# Patient Record
Sex: MALE | Race: WHITE | NOT HISPANIC OR LATINO | Employment: FULL TIME | URBAN - METROPOLITAN AREA
[De-identification: names, ages, dates, MRNs, and addresses within clinical notes are randomized per-mention and may not be internally consistent; named-entity substitution may affect disease eponyms.]

---

## 2017-01-25 ENCOUNTER — GENERIC CONVERSION - ENCOUNTER (OUTPATIENT)
Dept: OTHER | Facility: OTHER | Age: 57
End: 2017-01-25

## 2017-01-25 ENCOUNTER — LAB CONVERSION - ENCOUNTER (OUTPATIENT)
Dept: OTHER | Facility: OTHER | Age: 57
End: 2017-01-25

## 2017-01-25 LAB — TSH SERPL DL<=0.05 MIU/L-ACNC: 6.23 MIU/L (ref 0.4–4.5)

## 2017-02-06 ENCOUNTER — ALLSCRIPTS OFFICE VISIT (OUTPATIENT)
Dept: OTHER | Facility: OTHER | Age: 57
End: 2017-02-06

## 2017-06-29 ENCOUNTER — LAB CONVERSION - ENCOUNTER (OUTPATIENT)
Dept: OTHER | Facility: OTHER | Age: 57
End: 2017-06-29

## 2017-06-29 ENCOUNTER — GENERIC CONVERSION - ENCOUNTER (OUTPATIENT)
Dept: OTHER | Facility: OTHER | Age: 57
End: 2017-06-29

## 2017-06-29 LAB
A/G RATIO (HISTORICAL): 1.6 (CALC) (ref 1–2.5)
ALBUMIN SERPL BCP-MCNC: 4.4 G/DL (ref 3.6–5.1)
ALP SERPL-CCNC: 45 U/L (ref 40–115)
ALT SERPL W P-5'-P-CCNC: 19 U/L (ref 9–46)
AST SERPL W P-5'-P-CCNC: 17 U/L (ref 10–35)
BASOPHILS # BLD AUTO: 0.7 %
BASOPHILS # BLD AUTO: 39 CELLS/UL (ref 0–200)
BILIRUB SERPL-MCNC: 1.3 MG/DL (ref 0.2–1.2)
BUN SERPL-MCNC: 10 MG/DL (ref 7–25)
BUN/CREA RATIO (HISTORICAL): ABNORMAL (CALC) (ref 6–22)
CALCIUM SERPL-MCNC: 9.3 MG/DL (ref 8.6–10.3)
CHLORIDE SERPL-SCNC: 105 MMOL/L (ref 98–110)
CHOLEST SERPL-MCNC: 222 MG/DL (ref 125–200)
CHOLEST/HDLC SERPL: 4.4 (CALC)
CO2 SERPL-SCNC: 29 MMOL/L (ref 20–31)
CREAT SERPL-MCNC: 0.92 MG/DL (ref 0.7–1.33)
DEPRECATED RDW RBC AUTO: 14.5 % (ref 11–15)
EGFR AFRICAN AMERICAN (HISTORICAL): 105 ML/MIN/1.73M2
EGFR-AMERICAN CALC (HISTORICAL): 91 ML/MIN/1.73M2
EOSINOPHIL # BLD AUTO: 386 CELLS/UL (ref 15–500)
EOSINOPHIL # BLD AUTO: 6.9 %
GAMMA GLOBULIN (HISTORICAL): 2.7 G/DL (CALC) (ref 1.9–3.7)
GLUCOSE (HISTORICAL): 92 MG/DL (ref 65–99)
HCT VFR BLD AUTO: 46.9 % (ref 38.5–50)
HDLC SERPL-MCNC: 50 MG/DL
HGB BLD-MCNC: 15.6 G/DL (ref 13.2–17.1)
LDL CHOLESTEROL (HISTORICAL): 135 MG/DL (CALC)
LYMPHOCYTES # BLD AUTO: 1775 CELLS/UL (ref 850–3900)
LYMPHOCYTES # BLD AUTO: 31.7 %
MCH RBC QN AUTO: 30.4 PG (ref 27–33)
MCHC RBC AUTO-ENTMCNC: 33.2 G/DL (ref 32–36)
MCV RBC AUTO: 91.4 FL (ref 80–100)
MONOCYTES # BLD AUTO: 521 CELLS/UL (ref 200–950)
MONOCYTES (HISTORICAL): 9.3 %
NEUTROPHILS # BLD AUTO: 2878 CELLS/UL (ref 1500–7800)
NEUTROPHILS # BLD AUTO: 51.4 %
NON-HDL-CHOL (CHOL-HDL) (HISTORICAL): 172 MG/DL (CALC)
PLATELET # BLD AUTO: 227 THOUSAND/UL (ref 140–400)
PMV BLD AUTO: 8.2 FL (ref 7.5–12.5)
POTASSIUM SERPL-SCNC: 4.1 MMOL/L (ref 3.5–5.3)
RBC # BLD AUTO: 5.13 MILLION/UL (ref 4.2–5.8)
SODIUM SERPL-SCNC: 140 MMOL/L (ref 135–146)
T4 FREE SERPL-MCNC: 1.5 NG/DL (ref 0.8–1.8)
TOTAL PROTEIN (HISTORICAL): 7.1 G/DL (ref 6.1–8.1)
TRIGL SERPL-MCNC: 185 MG/DL
TSH SERPL DL<=0.05 MIU/L-ACNC: 7.57 MIU/L (ref 0.4–4.5)
WBC # BLD AUTO: 5.6 THOUSAND/UL (ref 3.8–10.8)

## 2017-07-03 DIAGNOSIS — E78.5 HYPERLIPIDEMIA: ICD-10-CM

## 2017-07-03 DIAGNOSIS — E03.9 HYPOTHYROIDISM: ICD-10-CM

## 2017-07-03 DIAGNOSIS — Z12.5 ENCOUNTER FOR SCREENING FOR MALIGNANT NEOPLASM OF PROSTATE: ICD-10-CM

## 2017-07-03 DIAGNOSIS — Z00.00 ENCOUNTER FOR GENERAL ADULT MEDICAL EXAMINATION WITHOUT ABNORMAL FINDINGS: ICD-10-CM

## 2017-07-10 ENCOUNTER — ALLSCRIPTS OFFICE VISIT (OUTPATIENT)
Dept: OTHER | Facility: OTHER | Age: 57
End: 2017-07-10

## 2017-07-25 ENCOUNTER — GENERIC CONVERSION - ENCOUNTER (OUTPATIENT)
Dept: OTHER | Facility: OTHER | Age: 57
End: 2017-07-25

## 2017-07-25 ENCOUNTER — LAB CONVERSION - ENCOUNTER (OUTPATIENT)
Dept: OTHER | Facility: OTHER | Age: 57
End: 2017-07-25

## 2017-07-25 LAB
T4 FREE SERPL-MCNC: 1.3 NG/DL (ref 0.8–1.8)
TSH SERPL DL<=0.05 MIU/L-ACNC: 6.2 MIU/L (ref 0.4–4.5)

## 2017-08-08 DIAGNOSIS — E03.9 HYPOTHYROIDISM: ICD-10-CM

## 2017-08-12 ENCOUNTER — LAB CONVERSION - ENCOUNTER (OUTPATIENT)
Dept: OTHER | Facility: OTHER | Age: 57
End: 2017-08-12

## 2017-08-12 LAB
T4 FREE SERPL-MCNC: 1.4 NG/DL (ref 0.8–1.8)
TSH SERPL DL<=0.05 MIU/L-ACNC: 4.26 MIU/L (ref 0.4–4.5)

## 2018-01-09 NOTE — RESULT NOTES
Message    Colon polyps removed came back as benign hyperplastic  Next colonoscopy in 5 years because of the patient's family history         LM w/ whomever answered the phone to have pt call the office/reminder set  thanks CF1      Patient aware of results  lc2  1       1 Amended By: Woody Posada; May 09 2016 11:02 AM EST   2 Amended By: Charleen Zayas; May 11 2016 3:35 PM EST    Verified Results  COLONOSCOPY [id number] 05:43PM Delphizuly Drown     Test Name Result Flag Reference   Colonoscopy 5/2/2016       (1) TISSUE EXAM [id number] 12:22PM Delphia Drown     Test Name Result Flag Reference   LAB AP CASE REPORT (Report)     Surgical Pathology Report             Case: [id number]                   Authorizing Provider: Anthony Linares MD     Collected:      05/02/2016 1222        Ordering Location:   [facility]    Received:      05/02/2016 [facility]                               Pathologist:      Travis [place] DO                               Specimens:  A) - Polyp, Colorectal, Bx descending polyp                              B) - Polyp, Colorectal, bx sigmoid polyp   LAB AP FINAL DIAGNOSIS      A  Colon, descending, polyp, biopsy:  - Hyperplastic polyp  B  Colon, sigmoid, polyp, biopsy:  - Hyperplastic polyps  Interpretation performed at [facility] [address]   [address] [facility]   LAB AP SURGICAL ADDITIONAL INFORMATION (Report)     These tests were developed and their performance characteristics   determined by [facility] or [employer]  They may not be cleared or approved by the [place]  Food and   Drug Administration  The FDA has determined that such clearance or   approval is not necessary  These tests are used for clinical purposes  They should not be regarded as investigational or for research   This   laboratory has been approved by CLIA 88, designated as a high-complexity   laboratory and is qualified to perform these tests  LAB AP GROSS DESCRIPTION (Report)     A  The specimen is received in formalin, labeled with patient name and   hospital number, and is designated biopsy descending polyp  The specimen   consists of a single white tan polypoid tissue fragment measuring 0 5   centimeters in greatest dimension  The specimen is entirely submitted in   one cassette  B  The specimen is received in formalin, labeled with patient name and   hospital number, and is designated biopsy sigmoid polyp  The specimen   consists of 2 tan-pink polypoid tissue fragments measuring 0 4 and 0 6   centimeters in greatest dimension  The specimen is entirely submitted in   one cassette  Note: The estimated total formalin fixation time based upon information   provided by the submitting clinician and the standard processing schedule   is 16 25 hours         Plan  Screening for colon cancer    · COLONOSCOPY ; every 5 years;  Last 92SIW0293; Next 12HMU5994; Status:Active

## 2018-01-10 DIAGNOSIS — E03.9 HYPOTHYROIDISM: ICD-10-CM

## 2018-01-10 DIAGNOSIS — E78.5 HYPERLIPIDEMIA: ICD-10-CM

## 2018-01-10 NOTE — PROGRESS NOTES
Assessment    1  Hypothyroidism (244 9) (E03 9)   2  Hyperlipidemia (272 4) (E78 5)   3  Well adult on routine health check (V70 0) (Z00 00)   4  Varicose veins of both lower extremities (454 9) (I83 93)    Plan  Hyperlipidemia, Hypothyroidism    · (1) COMPREHENSIVE METABOLIC PANEL; Status:Active; Requested RRI:82SLR9629;    · (1) LIPID PANEL, FASTING; Status:Active; Requested YKT:[id number];    · (1) T4, FREE; Status:Active; Requested AYB:26GKI4920;    · (1) TSH; Status:Active; Requested UQZ:77FOQ5057;   Hypothyroidism    · Levothyroxine Sodium 75 MCG Oral Tablet; take 1 tablet every day   · (1) T4, FREE; Status:Active; Requested for:85Pnd5064;    · (1) TSH; Status:Active; Requested for:65Olf7923;     Discussion/Summary  Impression: health maintenance visit, healthy adult male  Currently, he eats a healthy diet and has an adequate exercise regimen  Prostate cancer screening: the risks and benefits of prostate cancer screening were discussed and PSA was ordered  Testicular cancer screening: the risks and benefits of testicular cancer screening were discussed and testicular cancer screening is current  Colorectal cancer screening: the risks and benefits of colorectal cancer screening were discussed, the next colonoscopy is due Now and colorectal cancer screening is managed by Dr Liana Lawrence  Advice and education were given regarding aerobic exercise, weight bearing exercise, weight loss and cardiovascular risk reduction      - Generally healthy 49-year-old male  Current with screening colonoscopy and PSA  - Repeat TSH was elevated at 7 57 with a free T4 of 1 5  Patient is presently on levothyroxine 50 Âµg once daily  Will increase dose of levothyroxine to 75 Âµg once daily  Titration every 2 weeks until therapeutic  Adjustments will be made via telephone  - Patient declines referral to vascular surgery at this time for his varicose veins  He will consider this at a later time to discuss options for treatment   In the meantime he can continue wearing compression socks especially while he is on his feet  - Repeat colonoscopy in 2021  - Follow-up in 6 months with repeat thyroid function studies and cholesterol  Hopefully once his hypothyroidism is controlled then his cholesterol should improve  Chief Complaint  Preventative visit  History of Present Illness  , Adult Male: The patient is being seen for a health maintenance evaluation  The last health maintenance visit was 1 year(s) ago  General Health: The patient's health since the last visit is described as good  He has regular dental visits  He denies vision problems  He denies hearing loss  Immunizations status: not up to date  Lifestyle:  He consumes a diverse and healthy diet  He does not have any weight concerns  He exercises regularly  He does not use tobacco  He denies alcohol use  Screening: cancer screening reviewed and current  metabolic screening reviewed and current  risk screening reviewed and current  HPI: 52-year-old male presents for annual physical examination  No particular complaints or concerns  Patient has always been in good health  Patient did have a colonoscopy in May of 2016 which showed a small sigmoid diverticula and diminutive colon polyp which was a small tubular adenoma  He is due for repeat colonoscopy in 2021  Patient did have PSA screening done through his employer for biometrics which was reportedly normal  Hypothyroidism is not ideally controlled  TSH of 7 57 despite being on levothyroxine 50 Âµg daily  Cholesterol remains mildly elevated with total cholesterol of 220, HDL 50,   Patient does have varicose veins of bilateral lower extremities  He does wear compression socks when he is working  They are never painful  Review of Systems    Constitutional: No fever or chills, feels well, no tiredness, no recent weight gain or weight loss     Eyes: No complaints of eye pain, no red eyes, no discharge from eyes, no itchy eyes  ENT: no complaints of earache, no hearing loss, no nosebleeds, no nasal discharge, no sore throat, no hoarseness  Cardiovascular: No complaints of slow heart rate, no fast heart rate, no chest pain, no palpitations, no leg claudication, no lower extremity  Respiratory: No complaints of shortness of breath, no wheezing, no cough, no SOB on exertion, no orthopnea or PND  Gastrointestinal: No complaints of abdominal pain, no constipation, no nausea or vomiting, no diarrhea or bloody stools  Genitourinary: No complaints of dysuria, no incontinence, no hesitancy, no nocturia, no genital lesion, no testicular pain  Musculoskeletal: No complaints of arthralgia, no myalgias, no joint swelling or stiffness, no limb pain or swelling  Integumentary: No complaints of skin rash or skin lesions, no itching, no skin wound, no dry skin  Neurological: No compliants of headache, no confusion, no convulsions, no numbness or tingling, no dizziness or fainting, no limb weakness, no difficulty walking  Psychiatric: Is not suicidal, no sleep disturbances, no anxiety or depression, no change in personality, no emotional problems  Endocrine: No complaints of proptosis, no hot flashes, no muscle weakness, no erectile dysfunction, no deepening of the voice, no feelings of weakness  Hematologic/Lymphatic: No complaints of swollen glands, no swollen glands in the neck, does not bleed easily, no easy bruising  Active Problems    1  Colon polyps (211 3) (K63 5)   2  Family history of colon cancer (V16 0) (Z80 0)   3  Hyperlipidemia (272 4) (E78 5)   4  Hypothyroidism (244 9) (E03 9)   5  Screening for colon cancer (V76 51) (Z12 11)   6  Special screening examination for neoplasm of prostate (V76 44) (Z12 5)   7   Well adult on routine health check (V70 0) (Z00 00)    Past Medical History    · History of Cellulitis of ankle (682 6) (L03 119)   · History of nicotine dependence (V15 82) (G22 574)    Surgical History    · History of Reported Prior Surgical / Procedural History    Family History  Mother    · Denied: Family history of Crohn's disease without complication, unspecified  gastrointestinal tract location   · Family history of hypertension (V17 49) (Z82 49)   · Denied: Family history of liver disease  Father    · Family history of Colon cancer, ascending   · Denied: Family history of Crohn's disease without complication, unspecified  gastrointestinal tract location   · Denied: Family history of liver disease   · Family history of myocardial infarction (V17 3) (Z82 49)    Social History    · Caffeine use (V49 89) (F15 90)   · Daily caffeine consumption, 2-3 servings a day   · Former smoker (V15 82) (Z87 891)   · Four children   ·    · Occupation   · Social alcohol use (Z78 9)    Current Meds   1  Levothyroxine Sodium 50 MCG Oral Tablet; take 1 tablet by mouth every morning; Therapy: 71LYD0230 to (Evaluate:95Cvm8349)  Requested for: 56VJP3874; Last   Rx:24Wit0047 Ordered    Allergies    1  No Known Drug Allergies    Vitals   Recorded: 18XDH3156 07:57AM   Heart Rate 65   Respiration 16   Systolic 497   Diastolic 68   Height 6 ft 1 in   Weight 200 lb    BMI Calculated 26 39   BSA Calculated 2 15   O2 Saturation 98     Physical Exam    Constitutional   General appearance: No acute distress, well appearing and well nourished  Eyes   Conjunctiva and lids: No erythema, swelling or discharge  Pupils and irises: Equal, round, reactive to light  Ophthalmoscopic examination: Normal fundi and optic discs  Ears, Nose, Mouth, and Throat   External inspection of ears and nose: Normal     Otoscopic examination: Tympanic membranes translucent with normal light reflex  Canals patent without erythema  Hearing: Normal     Nasal mucosa, septum, and turbinates: Normal without edema or erythema  Lips, teeth, and gums: Normal, good dentition  Oropharynx: Normal with no erythema, edema, exudate or lesions  Neck   Neck: Supple, symmetric, trachea midline, no masses  Thyroid: Normal, no thyromegaly  Pulmonary   Respiratory effort: No increased work of breathing or signs of respiratory distress  Percussion of chest: Normal     Palpation of chest: Normal     Auscultation of lungs: Clear to auscultation  Cardiovascular   Palpation of heart: Normal PMI, no thrills  Auscultation of heart: Normal rate and rhythm, normal S1 and S2, no murmurs  Carotid pulses: 2+ bilaterally  Abdominal aorta: Normal     Femoral pulses: 2+ bilaterally  Pedal pulses: 2+ bilaterally  Examination of extremities for edema and/or varicosities: Normal     Chest   Breasts: Normal, no dimpling or skin changes appreciated  Palpation of breasts and axillae: Normal, no masses palpated  Chest: Normal     Abdomen   Abdomen: Non-tender, no masses  Liver and spleen: No hepatomegaly or splenomegaly  Examination for hernias: No hernias appreciated  Anus, perineum, and rectum: Normal sphincter tone, no masses, no prolapse  Lymphatic   Palpation of lymph nodes in neck: No lymphadenopathy  Palpation of lymph nodes in axillae: No lymphadenopathy  Palpation of lymph nodes in groin: No lymphadenopathy  Palpation of lymph nodes in other areas: No lymphadenopathy  Musculoskeletal   Gait and station: Normal     Inspection/palpation of digits and nails: Normal without clubbing or cyanosis  Inspection/palpation of joints, bones, and muscles: Normal     Range of motion: Normal     Stability: Normal     Muscle strength/tone: Normal     Skin   Skin and subcutaneous tissue: Normal without rashes or lesions  Palpation of skin and subcutaneous tissue: Normal turgor  Neurologic   Cranial nerves: Cranial nerves 2-12 intact  Reflexes: 2+ and symmetric  Sensation: No sensory loss      Psychiatric   Judgment and insight: Normal     Orientation to person, place and time: Normal     Recent and remote memory: Intact  Mood and affect: Normal        Results/Data  PHQ-2 Adult Depression Screening 81QOF3386 08:00AM User, Ahs     Test Name Result Flag Reference   PHQ-2 Adult Depression Score 0     Over the last two weeks, how often have you been bothered by any of the following problems? Little interest or pleasure in doing things: Not at all - 0  Feeling down, depressed, or hopeless: Not at all - 0   PHQ-2 Adult Depression Screening Negative         Procedure    Procedure:   Results: 20/20 in the right eye with corrective device, 20/20 in the left eye with corrective device WENT TO [address] [place] [address]  Screening for colon cancer   COLONOSCOPY; every 5 years; Last 99MXD0361; Next Due: 10SVQ6102;  Active    Signatures   Electronically signed by : Uyen Odell DO; Jul 10 2017  8:27AM EST                       (Author)

## 2018-01-11 ENCOUNTER — LAB CONVERSION - ENCOUNTER (OUTPATIENT)
Dept: OTHER | Facility: OTHER | Age: 58
End: 2018-01-11

## 2018-01-11 ENCOUNTER — GENERIC CONVERSION - ENCOUNTER (OUTPATIENT)
Dept: OTHER | Facility: OTHER | Age: 58
End: 2018-01-11

## 2018-01-11 LAB
A/G RATIO (HISTORICAL): 1.5 (CALC) (ref 1–2.5)
ALBUMIN SERPL BCP-MCNC: 4.3 G/DL (ref 3.6–5.1)
ALP SERPL-CCNC: 51 U/L (ref 40–115)
ALT SERPL W P-5'-P-CCNC: 24 U/L (ref 9–46)
AST SERPL W P-5'-P-CCNC: 18 U/L (ref 10–35)
BILIRUB SERPL-MCNC: 0.8 MG/DL (ref 0.2–1.2)
BUN SERPL-MCNC: 13 MG/DL (ref 7–25)
BUN/CREA RATIO (HISTORICAL): NORMAL (CALC) (ref 6–22)
CALCIUM SERPL-MCNC: 9.5 MG/DL (ref 8.6–10.3)
CHLORIDE SERPL-SCNC: 105 MMOL/L (ref 98–110)
CHOLEST SERPL-MCNC: 220 MG/DL
CHOLEST/HDLC SERPL: 4.9 (CALC)
CO2 SERPL-SCNC: 29 MMOL/L (ref 20–31)
CREAT SERPL-MCNC: 0.98 MG/DL (ref 0.7–1.33)
EGFR AFRICAN AMERICAN (HISTORICAL): 97 ML/MIN/1.73M2
EGFR-AMERICAN CALC (HISTORICAL): 84 ML/MIN/1.73M2
GAMMA GLOBULIN (HISTORICAL): 2.8 G/DL (CALC) (ref 1.9–3.7)
GLUCOSE (HISTORICAL): 95 MG/DL (ref 65–99)
HDLC SERPL-MCNC: 45 MG/DL
LDL CHOLESTEROL (HISTORICAL): 143 MG/DL (CALC)
NON-HDL-CHOL (CHOL-HDL) (HISTORICAL): 175 MG/DL (CALC)
POTASSIUM SERPL-SCNC: 4.3 MMOL/L (ref 3.5–5.3)
SODIUM SERPL-SCNC: 139 MMOL/L (ref 135–146)
T4 FREE SERPL-MCNC: 1.2 NG/DL (ref 0.8–1.8)
TOTAL PROTEIN (HISTORICAL): 7.1 G/DL (ref 6.1–8.1)
TRIGL SERPL-MCNC: 188 MG/DL
TSH SERPL DL<=0.05 MIU/L-ACNC: 4.98 MIU/L (ref 0.4–4.5)

## 2018-01-11 NOTE — RESULT NOTES
Discussion/Summary   Cholesterol slightly worse, TSH slightly more elevated  Need increased dose of levothyroxine  Verified Results  (1) CBC/PLT/DIFF 22XUS9115 07:26AM [facility]     Test Name Result Flag Reference   WHITE BLOOD CELL COUNT 5 6 Thousand/uL  3 8-10 8   RED BLOOD CELL COUNT 5 13 Million/uL  4 20-5 80   HEMOGLOBIN 15 6 g/dL  13 2-17 1   HEMATOCRIT 46 9 %  38 5-50 0   MCV 91 4 fL  80 0-100 0   MCH 30 4 pg  27 0-33 0   MCHC 33 2 g/dL  32 0-36 0   RDW 14 5 %  11 0-15 0   PLATELET COUNT 028 Thousand/uL  140-400   ABSOLUTE NEUTROPHILS 2878 cells/uL  5790-0044   ABSOLUTE LYMPHOCYTES 1775 cells/uL  850-3900   ABSOLUTE MONOCYTES 521 cells/uL  200-950   ABSOLUTE EOSINOPHILS 386 cells/uL     ABSOLUTE BASOPHILS 39 cells/uL  0-200   NEUTROPHILS 51 4 %     LYMPHOCYTES 31 7 %     MONOCYTES 9 3 %     EOSINOPHILS 6 9 %     BASOPHILS 0 7 %     MPV 8 2 fL  7 5-12 5     (1) COMPREHENSIVE METABOLIC PANEL 92OMR4280 76:68YJ [facility]     Test Name Result Flag Reference   GLUCOSE 92 mg/dL  65-99   Fasting reference interval   UREA NITROGEN (BUN) 10 mg/dL  7-25   CREATININE 0 92 mg/dL  0 70-1 33   For patients >52years of age, the reference limit  for Creatinine is approximately 13% higher for people  identified as -American  eGFR NON-AFR   AMERICAN 91 mL/min/1 73m2  > OR = 60   eGFR AFRICAN AMERICAN 105 mL/min/1 73m2  > OR = 60   BUN/CREATININE RATIO   6-05   NOT APPLICABLE (calc)   SODIUM 140 mmol/L  135-146   POTASSIUM 4 1 mmol/L  3 5-5 3   CHLORIDE 105 mmol/L     CARBON DIOXIDE 29 mmol/L  20-31   CALCIUM 9 3 mg/dL  8 6-10 3   PROTEIN, TOTAL 7 1 g/dL  6 1-8 1   ALBUMIN 4 4 g/dL  3 6-5 1   GLOBULIN 2 7 g/dL (calc)  1 9-3 7   ALBUMIN/GLOBULIN RATIO 1 6 (calc)  1 0-2 5   BILIRUBIN, TOTAL 1 3 mg/dL H 0 2-1 2   ALKALINE PHOSPHATASE 45 U/L     AST 17 U/L  10-35   ALT 19 U/L  9-46     (1) T4, FREE 28Jun2017 07:26AM Bibiana Constable     Test Name Result Flag Reference   T4, FREE 1 5 ng/dL 0 8-1 8     (1) LIPID PANEL, FASTING 28Jun2017 07:26AM Evaline Martinez     Test Name Result Flag Reference   CHOLESTEROL, TOTAL 222 mg/dL H 125-200   HDL CHOLESTEROL 50 mg/dL  > OR = 40   TRIGLICERIDES 104 mg/dL H <150   LDL-CHOLESTEROL 135 mg/dL (calc) H <130   Desirable range <100 mg/dL for patients with CHD or  diabetes and <70 mg/dL for diabetic patients with  known heart disease  CHOL/HDLC RATIO 4 4 (calc)  < OR = 5 0   NON HDL CHOLESTEROL 172 mg/dL (calc) H    Target for non-HDL cholesterol is 30 mg/dL higher than   LDL cholesterol target       (Q) TSH, 3RD GENERATION [id number] 07:26AM Evaline Martinez   REPORT COMMENT:  FASTING:YES     Test Name Result Flag Reference   TSH 7 57 mIU/L H 0 40-4 50

## 2018-01-11 NOTE — PROGRESS NOTES
Assessment    1  Encounter for preventive health examination (V70 0) (Z00 00)   2  Special screening examination for neoplasm of prostate (V76 44) (Z12 5)   3  Hypothyroidism (244 9) (E03 9)    Plan  Health Maintenance, Hypothyroidism, Special screening examination for neoplasm of  prostate    · (1) CBC/PLT/DIFF; Status:Active; Requested for:20Stl5439;    · (1) TSH WITH FT4 REFLEX; Status:Active; Requested for:61Agf6130; Health Maintenance, Special screening examination for neoplasm of prostate    · (1) COMPREHENSIVE METABOLIC PANEL; Status:Active; Requested for:60Okn0513;    · (1) LIPID PANEL, FASTING; Status:Active; Requested for:12Yyh4408;    · (1) PSA (SCREEN) (Dx V76 44 Screen for Prostate Cancer); Status:Active; Requested  for:89Wmn8555;     Discussion/Summary  Impression: health maintenance visit, healthy adult male  Currently, he eats a healthy diet and has an adequate exercise regimen  Prostate cancer screening: the risks and benefits of prostate cancer screening were discussed and PSA was ordered  Testicular cancer screening: the risks and benefits of testicular cancer screening were discussed and testicular cancer screening is current  Colorectal cancer screening: the risks and benefits of colorectal cancer screening were discussed, the next colonoscopy is due Now and colorectal cancer screening is managed by Dr Consuelo Olivia  - Generally healthy 61-year-old male  Order provided for screening labs  - Patient did have abnormal thyroid function studies done in 2009  He is concerned that his insurance may not cover TSH  He did not have repeat TSH done with last set of labs  He will contact his insurance company to see that they will cover lab testing  - Repeat colonoscopy in 2021  -We'll mail the results to the patient barring any significant abnormalities  Followup in one year for repeat annual physical examination  - Offered tetanus booster which the patient declines at this time   He is uncertain when his last tetanus booster was  Chief Complaint  Preventative visit  History of Present Illness  HM, Adult Male: The patient is being seen for a health maintenance evaluation  The last health maintenance visit was 1 year(s) ago  General Health: The patient's health since the last visit is described as good  He has regular dental visits  He denies vision problems  He denies hearing loss  Immunizations status: not up to date  Lifestyle:  He consumes a diverse and healthy diet  He does not have any weight concerns  He exercises regularly  He does not use tobacco  He denies alcohol use  Screening: cancer screening reviewed and current  metabolic screening reviewed and current  risk screening reviewed and current  HPI: 67-year-old male presents for annual physical examination  No particular complaints or concerns  Patient has always been in good health  Patient did have a colonoscopy in May of this year which showed a small sigmoid diverticula and diminutive colon polyp which was a small tubular adenoma  He is due for repeat colonoscopy in 2021  Review of Systems    Constitutional: No fever or chills, feels well, no tiredness, no recent weight gain or weight loss  Eyes: No complaints of eye pain, no red eyes, no discharge from eyes, no itchy eyes  ENT: no complaints of earache, no hearing loss, no nosebleeds, no nasal discharge, no sore throat, no hoarseness  Cardiovascular: No complaints of slow heart rate, no fast heart rate, no chest pain, no palpitations, no leg claudication, no lower extremity  Respiratory: No complaints of shortness of breath, no wheezing, no cough, no SOB on exertion, no orthopnea or PND  Gastrointestinal: No complaints of abdominal pain, no constipation, no nausea or vomiting, no diarrhea or bloody stools  Genitourinary: No complaints of dysuria, no incontinence, no hesitancy, no nocturia, no genital lesion, no testicular pain     Musculoskeletal: No complaints of arthralgia, no myalgias, no joint swelling or stiffness, no limb pain or swelling  Integumentary: No complaints of skin rash or skin lesions, no itching, no skin wound, no dry skin  Neurological: No compliants of headache, no confusion, no convulsions, no numbness or tingling, no dizziness or fainting, no limb weakness, no difficulty walking  Psychiatric: Is not suicidal, no sleep disturbances, no anxiety or depression, no change in personality, no emotional problems  Endocrine: No complaints of proptosis, no hot flashes, no muscle weakness, no erectile dysfunction, no deepening of the voice, no feelings of weakness  Hematologic/Lymphatic: No complaints of swollen glands, no swollen glands in the neck, does not bleed easily, no easy bruising  Active Problems    1  Family history of colon cancer (V16 0) (Z80 0)   2  Hypothyroidism (244 9) (E03 9)   3  Screening for colon cancer (V76 51) (Z12 11)   4  Special screening examination for neoplasm of prostate (V76 44) (Z12 5)   5   Well adult on routine health check (V70 0) (Z00 00)    Past Medical History    · History of Cellulitis of ankle (682 6) (L03 119)   · History of nicotine dependence (V15 82) (S35 078)    Surgical History    · History of Reported Prior Surgical / Procedural History    Family History  Mother    · Denied: Family history of Crohn's disease without complication, unspecified  gastrointestinal tract location   · Family history of hypertension (V17 49) (Z82 49)   · Denied: Family history of liver disease  Father    · Family history of Colon cancer, ascending   · Denied: Family history of Crohn's disease without complication, unspecified  gastrointestinal tract location   · Denied: Family history of liver disease   · Family history of myocardial infarction (V17 3) (Z82 49)    Social History    · Caffeine use (V49 89) (F15 90)   · Daily caffeine consumption, 2-3 servings a day   · Former smoker (V15 82) (P85 194)   · Four children   ·    · Occupation   · Social alcohol use (Z78 9)    Current Meds   1  No Reported Medications Recorded    Allergies    1  No Known Drug Allergies    Vitals   Recorded: 13FJS2915 09:35AM   Heart Rate 68   Respiration 16   Systolic 386   Diastolic 76   Height 6 ft 1 in   Weight 198 lb    BMI Calculated 26 12   BSA Calculated 2 14     Physical Exam    Constitutional   General appearance: No acute distress, well appearing and well nourished  Eyes   Conjunctiva and lids: No erythema, swelling or discharge  Pupils and irises: Equal, round, reactive to light  Ophthalmoscopic examination: Normal fundi and optic discs  Ears, Nose, Mouth, and Throat   External inspection of ears and nose: Normal     Otoscopic examination: Tympanic membranes translucent with normal light reflex  Canals patent without erythema  Hearing: Normal     Nasal mucosa, septum, and turbinates: Normal without edema or erythema  Lips, teeth, and gums: Normal, good dentition  Oropharynx: Normal with no erythema, edema, exudate or lesions  Neck   Neck: Supple, symmetric, trachea midline, no masses  Thyroid: Normal, no thyromegaly  Pulmonary   Respiratory effort: No increased work of breathing or signs of respiratory distress  Percussion of chest: Normal     Palpation of chest: Normal     Auscultation of lungs: Clear to auscultation  Cardiovascular   Palpation of heart: Normal PMI, no thrills  Auscultation of heart: Normal rate and rhythm, normal S1 and S2, no murmurs  Carotid pulses: 2+ bilaterally  Abdominal aorta: Normal     Femoral pulses: 2+ bilaterally  Pedal pulses: 2+ bilaterally  Examination of extremities for edema and/or varicosities: Normal     Chest   Breasts: Normal, no dimpling or skin changes appreciated  Palpation of breasts and axillae: Normal, no masses palpated  Chest: Normal     Abdomen   Abdomen: Non-tender, no masses  Liver and spleen: No hepatomegaly or splenomegaly  Examination for hernias: No hernias appreciated  Anus, perineum, and rectum: Normal sphincter tone, no masses, no prolapse  Lymphatic   Palpation of lymph nodes in neck: No lymphadenopathy  Palpation of lymph nodes in axillae: No lymphadenopathy  Palpation of lymph nodes in groin: No lymphadenopathy  Palpation of lymph nodes in other areas: No lymphadenopathy  Musculoskeletal   Gait and station: Normal     Inspection/palpation of digits and nails: Normal without clubbing or cyanosis  Inspection/palpation of joints, bones, and muscles: Normal     Range of motion: Normal     Stability: Normal     Muscle strength/tone: Normal     Skin   Skin and subcutaneous tissue: Normal without rashes or lesions  Palpation of skin and subcutaneous tissue: Normal turgor  Neurologic   Cranial nerves: Cranial nerves 2-12 intact  Reflexes: 2+ and symmetric  Sensation: No sensory loss  Psychiatric   Judgment and insight: Normal     Orientation to person, place and time: Normal     Recent and remote memory: Intact  Mood and affect: Normal        Results/Data  PHQ-2 Adult Depression Screening 08WHH4037 09:37AM User, Ahs     Test Name Result Flag Reference   PHQ-2 Adult Depression Score 0     Over the last two weeks, how often have you been bothered by any of the following problems? Little interest or pleasure in doing things: Not at all - 0  Feeling down, depressed, or hopeless: Not at all - 0   PHQ-2 Adult Depression Screening Negative         Procedure    Procedure:   Results: 20/20 in the right eye with corrective device, 20/20 in the left eye with corrective device WENT TO [address] [place] [address]  Screening for colon cancer   COLONOSCOPY; every 5 years; Last 95EEL7659; Next Due: 15PPB3515;  Active    Signatures   Electronically signed by : Sohail Rudolph DO; Jul 8 2016  9:59AM EST                       (Author)

## 2018-01-12 NOTE — RESULT NOTES
Discussion/Summary   Thyroid function has normalized on current dose of levothyroxine  Refill provided for a 90 day supply with one refill  Please follow up in January with labs as previously discussed  Verified Results  (1) T4, FREE 11Aug2017 02:18PM Harmeet Bynum     Test Name Result Flag Reference   T4, FREE 1 4 ng/dL  0 8-1 8     (Q) TSH, 3RD GENERATION 11Aug2017 02:18PM Harmeet Bynum   REPORT COMMENT:  FASTING:NO     Test Name Result Flag Reference   TSH 4 26 mIU/L  0 40-4 50       Plan  Hypothyroidism    · Levothyroxine Sodium 88 MCG Oral Tablet;  Take 1 tablet daily

## 2018-01-12 NOTE — RESULT NOTES
Message   Please have the patient schedule follow-up in the next 2-3 weeks to review lab work and discuss possibly starting medications for hypothyroidism     Verified Results  (1) CBC/PLT/DIFF 99ZUH1960 07:39AM Margo Severino    Order Number: [id number]  TW Order Number: [id number]     Test Name Result Flag Reference   WBC COUNT 4 84 Thousand/uL  4 31-10 16   RBC COUNT 5 15 Million/uL  3 88-5 62   HEMOGLOBIN 15 7 g/dL  12 0-17 0   HEMATOCRIT 46 0 %  36 5-49 3   MCV 89 fL  82-98   MCH 30 5 pg  26 8-34 3   MCHC 34 1 g/dL  31 4-37 4   RDW 13 5 %  11 6-15 1   MPV 10 3 fL  8 9-12 7   PLATELET COUNT 781 Thousands/uL  149-390   nRBC AUTOMATED 0 /100 WBCs     NEUTROPHILS RELATIVE PERCENT 46 %  43-75   LYMPHOCYTES RELATIVE PERCENT 35 %  14-44   MONOCYTES RELATIVE PERCENT 11 %  4-12   EOSINOPHILS RELATIVE PERCENT 7 % H 0-6   BASOPHILS RELATIVE PERCENT 1 %  0-1   NEUTROPHILS ABSOLUTE COUNT 2 26 Thousands/?L  1 85-7 62   LYMPHOCYTES ABSOLUTE COUNT 1 68 Thousands/?L  0 60-4 47   MONOCYTES ABSOLUTE COUNT 0 54 Thousand/?L  0 17-1 22   EOSINOPHILS ABSOLUTE COUNT 0 32 Thousand/?L  0 00-0 61   BASOPHILS ABSOLUTE COUNT 0 03 Thousands/?L  0 00-0 10     (1) COMPREHENSIVE METABOLIC PANEL 55RFJ1195 61:15OI Margo Severino    Order Number: [id number]  TW Order Number: [id number] Order Number: [id number] Order Number: [id number]     Test Name Result Flag Reference   GLUCOSE,RANDM 93 mg/dL     If the patient is fasting, the ADA then defines impaired fasting glucose as > 100 mg/dL and diabetes as > or equal to 123 mg/dL     SODIUM 140 mmol/L  136-145   POTASSIUM 4 3 mmol/L  3 5-5 3   CHLORIDE 106 mmol/L  100-108   CARBON DIOXIDE 28 mmol/L  21-32   ANION GAP (CALC) 6 mmol/L  4-13   BLOOD UREA NITROGEN 14 mg/dL  5-25   CREATININE 0 92 mg/dL  0 60-1 30   Standardized to IDMS reference method   CALCIUM 8 7 mg/dL  8 3-10 1   BILI, TOTAL 0 89 mg/dL  0 20-1 00   ALK PHOSPHATAS 46 U/L     ALT (SGPT) 25 U/L  12-78   AST(SGOT) 11 U/L  5-45   ALBUMIN 4 0 g/dL  3 5-5 0   TOTAL PROTEIN 7 2 g/dL  6 4-8 2   eGFR Non-African American      >60 0 ml/min/1 73[facility] Disease Education Program recommendations are as follows:  GFR calculation is accurate only with a steady state creatinine  Chronic Kidney disease less than 60 ml/min/1 73 sq  meters  Kidney failure less than 15 ml/min/1 73 sq  meters  (1) TSH WITH FT4 REFLEX 78SNH6993 07:39AM Harmeet [facility] Order Number: [id number]  TW Order Number: [id number]     Test Name Result Flag Reference   TSH 5 320 uIU/mL H 0 358-3 740   Patients undergoing fluorescein dye angiography may retain small amounts of fluorescein in the body for 48-72 hours post procedure  Samples containing fluorescein can produce falsely depressed TSH values  If the patient had this procedure,a specimen should be resubmitted post fluorescein clearance  T4,FREE 0 90 ng/dL  0 76-1 46     (1) LIPID PANEL, FASTING 41WVK0608 07:39AM Harmeet [employer]    Order Number: [id number]  TW Order Number: [id number] Order Number: [id number] Order Number: [id number]     Test Name Result Flag Reference   CHOLESTEROL 199 mg/dL     HDL,DIRECT 42 mg/dL  40-60   Specimen collection should occur prior to Metamizole administration due to the potential for falsely depressed results  LDL CHOLESTEROL CALCULATED 118 mg/dL H 0-100   Triglyceride:         Normal              <150 mg/dl       Borderline High    150-199 mg/dl       High               200-499 mg/dl       Very High          >499 mg/dl  Cholesterol:         Desirable        <200 mg/dl      Borderline High  200-239 mg/dl      High             >239 mg/dl  HDL Cholesterol:        High    >59 mg/dL      Low     <41 mg/dL  LDL CALCULATED:    This screening LDL is a calculated result    It does not have the accuracy of the Direct Measured LDL in the monitoring of patients with hyperlipidemia and/or statin therapy  Direct Measure LDL (WTU355) must be ordered separately in these patients  TRIGLYCERIDES 196 mg/dL H <=150   Specimen collection should occur prior to N-Acetylcysteine or Metamizole administration due to the potential for falsely depressed results       (1) PSA (SCREEN) (Dx V76 44 Screen for Prostate Cancer) 38CUK2532 07:39AM Kelsi Causey Order Number: [id number]   Order Number: [id number]     Test Name Result Flag Reference   PROSTATE SPECIFIC ANTIGEN 1 0 ng/mL  0 0-4 0

## 2018-01-13 VITALS
HEART RATE: 74 BPM | DIASTOLIC BLOOD PRESSURE: 62 MMHG | TEMPERATURE: 98.5 F | BODY MASS INDEX: 26.77 KG/M2 | WEIGHT: 202 LBS | SYSTOLIC BLOOD PRESSURE: 128 MMHG | RESPIRATION RATE: 16 BRPM | HEIGHT: 73 IN

## 2018-01-14 VITALS
DIASTOLIC BLOOD PRESSURE: 68 MMHG | OXYGEN SATURATION: 98 % | HEART RATE: 65 BPM | WEIGHT: 200 LBS | RESPIRATION RATE: 16 BRPM | HEIGHT: 73 IN | SYSTOLIC BLOOD PRESSURE: 120 MMHG | BODY MASS INDEX: 26.51 KG/M2

## 2018-01-16 NOTE — RESULT NOTES
Verified Results  (1) T4, FREE 84Pnb2445 07:33AM Alpa Davis     Test Name Result Flag Reference   T4, FREE 1 3 ng/dL  0 8-1 8     (Q) TSH, 3RD GENERATION 81Jfo5549 07:33AM Alpa Janiemurray   REPORT COMMENT:  FASTING:NO     Test Name Result Flag Reference   TSH 6 20 mIU/L H 0 40-4 50       Plan  Hypothyroidism    · From  Levothyroxine Sodium 75 MCG Oral Tablet take 1 tablet every day To  Levothyroxine Sodium 88 MCG Oral Tablet TAKE 1 TABLET DAILY   · (1) T4, FREE; Status:Active; Requested for:98Rgn9973;    · (1) TSH; Status:Active;  Requested for:11Oll4866;

## 2018-01-19 ENCOUNTER — ALLSCRIPTS OFFICE VISIT (OUTPATIENT)
Dept: OTHER | Facility: OTHER | Age: 58
End: 2018-01-19

## 2018-01-20 NOTE — PROGRESS NOTES
Assessment   1  Hypothyroidism (244 9) (E03 9)   2  Hyperlipidemia (272 4) (E78 5)   3  Special screening examination for neoplasm of prostate (V76 44) (Z12 5)    Plan   Hyperlipidemia, Hypothyroidism, Special screening examination for neoplasm of    prostate    · (1) CBC/PLT/DIFF; Status:Active; Requested for:62Oum6998;    · (1) COMPREHENSIVE METABOLIC PANEL; Status:Active; Requested for:47Ltu9720;    · (1) LIPID PANEL, FASTING; Status:Active; Requested for:78Ewv3544;    · (1) PSA (SCREEN) (Dx V76 44 Screen for Prostate Cancer); Status:Active; Requested    for:60Owi8830;    · (1) T3 TOTAL; Status:Active; Requested for:17Fpu8522;    · (1) T4, FREE; Status:Active; Requested for:01Vje6824;    · (1) TSH; Status:Active; Requested for:69Unt3818;   Hypothyroidism    · Levothyroxine Sodium 112 MCG Oral Tablet; TAKE 1 TABLET DAILY    Discussion/Summary      - Hypothyroidism is not ideally controlled on levothyroxine 88 Âµg once daily  Increase dose to 112 Âµg once daily  Patient is reluctant to have repeat lab testing done on a short interval due to his insurance and very high deductible  We'll repeat thyroid function studies in 6 months with annual physical dietary modifications to help control his cholesterol  Patient is taking over-the-counter fish oil supplementation will continue to do so  Patient will follow-up in early July with labs for annual physical     The patient was counseled regarding diagnostic results,-- instructions for management,-- prognosis,-- impressions  Possible side effects of new medications were reviewed with the patient/guardian today  The treatment plan was reviewed with the patient/guardian  The patient/guardian understands and agrees with the treatment plan      Chief Complaint   6 month follow up and lab review  Patient is here today for follow up of chronic conditions described in HPI        History of Present Illness   Patient presents for follow-up of hypothyroidism and hyperlipidemia  TSH is slightly elevated at 4 98  Patient has gained weight during the winter  He feels well  Cholesterol is also slightly worse with lower HDL and higher LDL  Patient is hoping to start exercising more in nicer weather to lose weight  Review of Systems        Constitutional: No fever or chills, feels well, no tiredness, no recent weight gain or weight loss  Eyes: No complaints of eye pain, no red eyes, no discharge from eyes, no itchy eyes  ENT: no complaints of earache, no hearing loss, no nosebleeds, no nasal discharge, no sore throat, no hoarseness  Cardiovascular: No complaints of slow heart rate, no fast heart rate, no chest pain, no palpitations, no leg claudication, no lower extremity  Respiratory: No complaints of shortness of breath, no wheezing, no cough, no SOB on exertion, no orthopnea or PND  Gastrointestinal: No complaints of abdominal pain, no constipation, no nausea or vomiting, no diarrhea or bloody stools  Genitourinary: No complaints of dysuria, no incontinence, no hesitancy, no nocturia, no genital lesion, no testicular pain  Musculoskeletal: No complaints of arthralgia, no myalgias, no joint swelling or stiffness, no limb pain or swelling  Integumentary: No complaints of skin rash or skin lesions, no itching, no skin wound, no dry skin  Neurological: No compliants of headache, no confusion, no convulsions, no numbness or tingling, no dizziness or fainting, no limb weakness, no difficulty walking  Psychiatric: Is not suicidal, no sleep disturbances, no anxiety or depression, no change in personality, no emotional problems  Endocrine: No complaints of proptosis, no hot flashes, no muscle weakness, no erectile dysfunction, no deepening of the voice, no feelings of weakness  Hematologic/Lymphatic: No complaints of swollen glands, no swollen glands in the neck, does not bleed easily, no easy bruising        Active Problems   1  Colon polyps (211 3) (K63 5)   2  Family history of colon cancer (V16 0) (Z80 0)   3  Hyperlipidemia (272 4) (E78 5)   4  Hypothyroidism (244 9) (E03 9)   5  Screening for colon cancer (V76 51) (Z12 11)   6  Special screening examination for neoplasm of prostate (V76 44) (Z12 5)   7  Varicose veins of both lower extremities (454 9) (I83 93)   8  Well adult on routine health check (V70 0) (Z00 00)    Past Medical History   1  History of Cellulitis of ankle (682 6) (L03 119)   2  History of nicotine dependence (V15 82) (D96 943)     The active problems and past medical history were reviewed and updated today  Surgical History   1  History of Reported Prior Surgical / Procedural History     The surgical history was reviewed and updated today  Family History   Mother    1  Denied: Family history of Crohn's disease without complication, unspecified     gastrointestinal tract location   2  Family history of hypertension (V17 49) (Z82 49)   3  Denied: Family history of liver disease  Father    4  Family history of Colon cancer, ascending   5  Denied: Family history of Crohn's disease without complication, unspecified     gastrointestinal tract location   6  Denied: Family history of liver disease   7  Family history of myocardial infarction (V17 3) (Z82 49)     The family history was reviewed and updated today  Social History    · Caffeine use (V49 89) (F15 90)   · Daily caffeine consumption, 2-3 servings a day   · Former smoker (V15 82) (Z87 891)   · Four children   ·    · Occupation   · Social alcohol use (Z78 9)  The social history was reviewed and updated today  The social history was reviewed and is unchanged  Current Meds    1  Levothyroxine Sodium 88 MCG Oral Tablet; Take 1 tablet daily; Therapy: 31VKY6767 to (Evaluate:50Pvx0198)  Requested for: 45LMM6552; Last     Rx:29Dfg6360 Ordered     The medication list was reviewed and updated today  Allergies   1   No Known Drug Allergies    Vitals   Vital Signs    Recorded: 71SKJ3074 07:53AM   Heart Rate 68   Respiration 16   Systolic 705   Diastolic 70   Height 6 ft 1 in   Weight 209 lb    BMI Calculated 27 57   BSA Calculated 2 19   O2 Saturation 98     Physical Exam        Constitutional      General appearance: No acute distress, well appearing and well nourished  Ears, Nose, Mouth, and Throat      External inspection of ears and nose: Normal        Otoscopic examination: Tympanic membrance translucent with normal light reflex  Canals patent without erythema  Nasal mucosa, septum, and turbinates: Normal without edema or erythema  Oropharynx: Normal with no erythema, edema, exudate or lesions  Pulmonary      Respiratory effort: No increased work of breathing or signs of respiratory distress  Auscultation of lungs: Clear to auscultation, equal breath sounds bilaterally, no wheezes, no rales, no rhonci  Cardiovascular      Palpation of heart: Normal PMI, no thrills  Auscultation of heart: Normal rate and rhythm, normal S1 and S2, without murmurs  Examination of extremities for edema and/or varicosities: Normal        Carotid pulses: Normal        Lymphatic      Palpation of lymph nodes in neck: No lymphadenopathy  Musculoskeletal      Gait and station: Normal           Health Management   Screening for colon cancer   COLONOSCOPY; every 5 years; Last 28KVN4784; Next Due: 49SNL7588;  Active    Signatures    Electronically signed by : Uyen Odell DO; Jan 19 2018  8:24AM EST                       (Author)

## 2018-01-22 VITALS
SYSTOLIC BLOOD PRESSURE: 112 MMHG | BODY MASS INDEX: 27.7 KG/M2 | WEIGHT: 209 LBS | OXYGEN SATURATION: 98 % | HEIGHT: 73 IN | RESPIRATION RATE: 16 BRPM | DIASTOLIC BLOOD PRESSURE: 70 MMHG | HEART RATE: 68 BPM

## 2018-02-26 NOTE — RESULT NOTES
Discussion/Summary   Cholesterol elevated and hypothyroidism not ideally controlled     Verified Results  (1) COMPREHENSIVE METABOLIC PANEL [id number] 76:68WE Sabra Tabares     Test Name Result Flag Reference   GLUCOSE 95 mg/dL  65-99   Fasting reference interval   UREA NITROGEN (BUN) 13 mg/dL  7-25   CREATININE 0 98 mg/dL  0 70-1 33   For patients >52years of age, the reference limit  for Creatinine is approximately 13% higher for people  identified as -American  eGFR NON-AFR  AMERICAN 84 mL/min/1 73m2  > OR = 60   eGFR AFRICAN AMERICAN 97 mL/min/1 73m2  > OR = 60   BUN/CREATININE RATIO   1-02   NOT APPLICABLE (calc)   SODIUM 139 mmol/L  135-146   POTASSIUM 4 3 mmol/L  3 5-5 3   CHLORIDE 105 mmol/L     CARBON DIOXIDE 29 mmol/L  20-31   CALCIUM 9 5 mg/dL  8 6-10 3   PROTEIN, TOTAL 7 1 g/dL  6 1-8 1   ALBUMIN 4 3 g/dL  3 6-5 1   GLOBULIN 2 8 g/dL (calc)  1 9-3 7   ALBUMIN/GLOBULIN RATIO 1 5 (calc)  1 0-2 5   BILIRUBIN, TOTAL 0 8 mg/dL  0 2-1 2   ALKALINE PHOSPHATASE 51 U/L     AST 18 U/L  10-35   ALT 24 U/L  9-46     (1) LIPID PANEL, FASTING [id number] 06:57AM [facility] Tabares     Test Name Result Flag Reference   CHOLESTEROL, TOTAL 220 mg/dL H <200   HDL CHOLESTEROL 45 mg/dL  >65   TRIGLICERIDES 067 mg/dL H <150   LDL-CHOLESTEROL 143 mg/dL (calc) H    Reference range: <100     Desirable range <100 mg/dL for patients with CHD or  diabetes and <70 mg/dL for diabetic patients with  known heart disease  LDL-C is now calculated using the Jim-Chiu   calculation, which is a validated novel method providing   better accuracy than the Friedewald equation in the   estimation of LDL-C  Mark Alvarez  [place] [id number];553(69): [phone]   (http://[employer]/faq/MPP464)   CHOL/HDLC RATIO 4 9 (calc)  <5 0   NON HDL CHOLESTEROL 175 mg/dL (calc) H <130   For patients with diabetes plus 1 major ASCVD risk   factor, treating to a non-HDL-C goal of <100 mg/dL   (LDL-C of <70 mg/dL) is considered a therapeutic   option      (1) T4, FREE [id number] 06:57AM [facility]     Test Name Result Flag Reference   T4, FREE 1 2 ng/dL  0 8-1 8     (Q) TSH, 3RD GENERATION [id number] 06:57AM [facility]   REPORT COMMENT:  FASTING:YES     Test Name Result Flag Reference   TSH 4 98 mIU/L H 0 40-4 50

## 2018-07-05 DIAGNOSIS — Z12.5 ENCOUNTER FOR SCREENING FOR MALIGNANT NEOPLASM OF PROSTATE: ICD-10-CM

## 2018-07-05 DIAGNOSIS — E03.9 HYPOTHYROIDISM: ICD-10-CM

## 2018-07-05 DIAGNOSIS — E78.5 HYPERLIPIDEMIA: ICD-10-CM

## 2018-07-07 LAB
ALBUMIN SERPL-MCNC: 4.1 G/DL (ref 3.6–5.1)
ALBUMIN/GLOB SERPL: 1.5 (CALC) (ref 1–2.5)
ALP SERPL-CCNC: 43 U/L (ref 40–115)
ALT SERPL-CCNC: 15 U/L (ref 9–46)
AST SERPL-CCNC: 14 U/L (ref 10–35)
BASOPHILS # BLD AUTO: 38 CELLS/UL (ref 0–200)
BASOPHILS NFR BLD AUTO: 0.6 %
BILIRUB SERPL-MCNC: 1.3 MG/DL (ref 0.2–1.2)
BUN SERPL-MCNC: 11 MG/DL (ref 7–25)
BUN/CREAT SERPL: ABNORMAL (CALC) (ref 6–22)
CALCIUM SERPL-MCNC: 9 MG/DL (ref 8.6–10.3)
CHLORIDE SERPL-SCNC: 104 MMOL/L (ref 98–110)
CHOLEST SERPL-MCNC: 196 MG/DL
CHOLEST/HDLC SERPL: 4.2 (CALC)
CO2 SERPL-SCNC: 28 MMOL/L (ref 20–31)
CREAT SERPL-MCNC: 1.04 MG/DL (ref 0.7–1.25)
EOSINOPHIL # BLD AUTO: 277 CELLS/UL (ref 15–500)
EOSINOPHIL NFR BLD AUTO: 4.4 %
ERYTHROCYTE [DISTWIDTH] IN BLOOD BY AUTOMATED COUNT: 13.3 % (ref 11–15)
GLOBULIN SER CALC-MCNC: 2.7 G/DL (CALC) (ref 1.9–3.7)
GLUCOSE SERPL-MCNC: 88 MG/DL (ref 65–99)
HCT VFR BLD AUTO: 44.8 % (ref 38.5–50)
HDLC SERPL-MCNC: 47 MG/DL
HGB BLD-MCNC: 15.1 G/DL (ref 13.2–17.1)
LDLC SERPL CALC-MCNC: 126 MG/DL (CALC)
LYMPHOCYTES # BLD AUTO: 2022 CELLS/UL (ref 850–3900)
LYMPHOCYTES NFR BLD AUTO: 32.1 %
MCH RBC QN AUTO: 30.6 PG (ref 27–33)
MCHC RBC AUTO-ENTMCNC: 33.7 G/DL (ref 32–36)
MCV RBC AUTO: 90.9 FL (ref 80–100)
MONOCYTES # BLD AUTO: 706 CELLS/UL (ref 200–950)
MONOCYTES NFR BLD AUTO: 11.2 %
NEUTROPHILS # BLD AUTO: 3257 CELLS/UL (ref 1500–7800)
NEUTROPHILS NFR BLD AUTO: 51.7 %
NONHDLC SERPL-MCNC: 149 MG/DL (CALC)
PLATELET # BLD AUTO: 217 THOUSAND/UL (ref 140–400)
PMV BLD REES-ECKER: 10.2 FL (ref 7.5–12.5)
POTASSIUM SERPL-SCNC: 4.3 MMOL/L (ref 3.5–5.3)
PROT SERPL-MCNC: 6.8 G/DL (ref 6.1–8.1)
PSA SERPL-MCNC: 2.4 NG/ML
RBC # BLD AUTO: 4.93 MILLION/UL (ref 4.2–5.8)
SL AMB EGFR AFRICAN AMERICAN: 90 ML/MIN/1.73M2
SL AMB EGFR NON AFRICAN AMERICAN: 78 ML/MIN/1.73M2
SODIUM SERPL-SCNC: 139 MMOL/L (ref 135–146)
T3 SERPL-MCNC: 87 NG/DL (ref 76–181)
T4 FREE SERPL-MCNC: 1.3 NG/DL (ref 0.8–1.8)
TRIGL SERPL-MCNC: 121 MG/DL
TSH SERPL-ACNC: 3.81 MIU/L (ref 0.4–4.5)
WBC # BLD AUTO: 6.3 THOUSAND/UL (ref 3.8–10.8)

## 2018-07-13 RX ORDER — LEVOTHYROXINE SODIUM 112 UG/1
1 TABLET ORAL DAILY
COMMUNITY
Start: 2017-02-06 | End: 2018-07-16 | Stop reason: SDUPTHER

## 2018-07-16 ENCOUNTER — OFFICE VISIT (OUTPATIENT)
Dept: FAMILY MEDICINE CLINIC | Facility: CLINIC | Age: 58
End: 2018-07-16
Payer: COMMERCIAL

## 2018-07-16 VITALS
BODY MASS INDEX: 26.24 KG/M2 | OXYGEN SATURATION: 98 % | HEIGHT: 73 IN | SYSTOLIC BLOOD PRESSURE: 122 MMHG | HEART RATE: 82 BPM | DIASTOLIC BLOOD PRESSURE: 84 MMHG | RESPIRATION RATE: 16 BRPM | WEIGHT: 198 LBS

## 2018-07-16 DIAGNOSIS — R97.20 INCREASED PROSTATE SPECIFIC ANTIGEN (PSA) VELOCITY: ICD-10-CM

## 2018-07-16 DIAGNOSIS — E78.2 MIXED HYPERLIPIDEMIA: ICD-10-CM

## 2018-07-16 DIAGNOSIS — Z00.00 ENCOUNTER FOR ANNUAL PHYSICAL EXAM: Primary | ICD-10-CM

## 2018-07-16 DIAGNOSIS — E03.9 HYPOTHYROIDISM, UNSPECIFIED TYPE: ICD-10-CM

## 2018-07-16 PROBLEM — I83.93 VARICOSE VEINS OF BOTH LOWER EXTREMITIES: Status: ACTIVE | Noted: 2017-07-10

## 2018-07-16 PROBLEM — E78.5 HYPERLIPIDEMIA: Status: ACTIVE | Noted: 2017-02-06

## 2018-07-16 PROCEDURE — 99396 PREV VISIT EST AGE 40-64: CPT | Performed by: FAMILY MEDICINE

## 2018-07-16 RX ORDER — LEVOTHYROXINE SODIUM 112 UG/1
112 TABLET ORAL DAILY
Qty: 90 TABLET | Refills: 1 | Status: SHIPPED | OUTPATIENT
Start: 2018-07-16 | End: 2019-01-11 | Stop reason: SDUPTHER

## 2018-07-16 NOTE — ASSESSMENT & PLAN NOTE
Patient did have increase in his PSA from 1 0-2 4 however this was in the span of a 2 year time frame    Check short interval PSA in 6 months

## 2018-07-16 NOTE — ASSESSMENT & PLAN NOTE
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

## 2018-07-16 NOTE — PROGRESS NOTES
Subjective:      Patient ID: Angela Valdez is a 59 y o  male  8year-old male presents for annual physical examination  No particular complaints or concerns  Patient did have increase in his PSA  Last PSA was done in 2016  There was 1 year missing  PSA increased from 1 0-2 4  No issues with urination  No nocturia, weakness of urinary stream   No family history of prostate cancer  Hypothyroidism is significantly improved on levothyroxine 112 mcg once daily  Patient's cholesterol has also improved since adjusting his dose of levothyroxine  No significant dietary changes  Patient is current with screening colonoscopy which was last done in May of 2016  Not due for another colonoscopy until 2021        No past medical history on file  Family History   Problem Relation Age of Onset    Hypertension Mother     Colon cancer Father         ascending    Heart attack Father         MI       Past Surgical History:   Procedure Laterality Date    [place] COLONOSCOPY FLX DX W/COLLJ SPEC WHEN PFRMD N/A 5/2/2016    Procedure: COLONOSCOPY;  Surgeon: Veda Keene MD;  [facility]; Service: Gastroenterology    TONSILLECTOMY          reports that he has quit smoking  He has never used smokeless tobacco  He reports that he drinks alcohol  He reports that he does not use drugs  Current Outpatient Prescriptions:     levothyroxine 112 mcg tablet, Take 1 tablet by mouth daily, Disp: , Rfl:     The following portions of the patient's history were reviewed and updated as appropriate: allergies, current medications, past family history, past medical history, past social history, past surgical history and problem list     Review of Systems   Constitutional: Negative  HENT: Negative  Eyes: Negative  Respiratory: Negative  Cardiovascular: Negative  Gastrointestinal: Negative  Endocrine: Negative  Genitourinary: Negative  Musculoskeletal: Negative  Skin: Negative  Allergic/Immunologic: Negative  Neurological: Negative  Hematological: Negative  Psychiatric/Behavioral: Negative  All other systems reviewed and are negative  Objective:    /84   Pulse 82   Resp 16   Ht 6' 1" (1 854 m)   Wt 89 8 kg (198 lb)   SpO2 98%   BMI 26 12 kg/m²      Physical Exam   Constitutional: He is oriented to person, place, and time  He appears well-developed and well-nourished  HENT:   Head: Normocephalic  Eyes: Conjunctivae and EOM are normal  Pupils are equal, round, and reactive to light  Neck: Normal range of motion  Neck supple  Cardiovascular: Normal rate, regular rhythm and normal heart sounds  Pulmonary/Chest: Effort normal and breath sounds normal    Abdominal: Soft  Bowel sounds are normal    Genitourinary: Rectum normal, prostate normal and penis normal  Rectal exam shows guaiac negative stool  Musculoskeletal: Normal range of motion  Neurological: He is alert and oriented to person, place, and time  Psychiatric: He has a normal mood and affect  His behavior is normal  Judgment and thought content normal    Nursing note and vitals reviewed          Recent Results (from the past 1008 hour(s))   Lipid panel    Collection Time: 07/06/18  7:00 AM   Result Value Ref Range    Total Cholesterol 196 <200 mg/dL    HDL 47 >40 mg/dL    Triglycerides 121 <150 mg/dL    LDL Direct 126 (H) mg/dL (calc)    Chol HDLC Ratio 4 2 <5 0 (calc)    Non-HDL Cholesterol 149 (H) <130 mg/dL (calc)   Comprehensive metabolic panel    Collection Time: 07/06/18  7:00 AM   Result Value Ref Range    SL AMB GLUCOSE 88 65 - 99 mg/dL    BUN 11 7 - 25 mg/dL    Creatinine, Serum 1 04 0 70 - 1 25 mg/dL    eGFR Non  78 > OR = 60 mL/min/1 73m2    SL AMB EGFR  90 > OR = 60 mL/min/1 73m2    SL AMB BUN/CREATININE RATIO NOT APPLICABLE 6 - 22 (calc)    SL AMB SODIUM 139 135 - 146 mmol/L    SL AMB POTASSIUM 4 3 3 5 - 5 3 mmol/L    SL AMB CHLORIDE 104 98 - 110 mmol/L    SL AMB CARBON DIOXIDE 28 20 - 31 mmol/L    SL AMB CALCIUM 9 0 8 6 - 10 3 mg/dL    SL AMB PROTEIN, TOTAL 6 8 6 1 - 8 1 g/dL    Serum Albumin 4 1 3 6 - 5 1 g/dL    SL AMB GLOBULIN 2 7 1 9 - 3 7 g/dL (calc)    SL AMB ALBUMIN/GLOBULIN RATIO 1 5 1 0 - 2 5 (calc)    SL AMB BILIRUBIN, TOTAL 1 3 (H) 0 2 - 1 2 mg/dL    SL AMB ALKALINE PHOSPHATASE 43 40 - 115 U/L    SL AMB AST 14 10 - 35 U/L    SL AMB ALT 15 9 - 46 U/L   CBC and differential    Collection Time: 07/06/18  7:00 AM   Result Value Ref Range    SL AMB LAB WHITE BLOOD CELL COUNT 6 3 3 8 - 10 8 Thousand/uL    SL AMB LAB RED BLOOD CELLS 4 93 4 20 - 5 80 Million/uL    Hemoglobin 15 1 13 2 - 17 1 g/dL    Hematocrit 44 8 38 5 - 50 0 %    MCV 90 9 80 0 - 100 0 fL    MCH 30 6 27 0 - 33 0 pg    MCHC 33 7 32 0 - 36 0 g/dL    RDW 13 3 11 0 - 15 0 %    Platelet Count 377 968 - 400 Thousand/uL    SL AMB MPV 10 2 7 5 - 12 5 fL    Neutrophils (Absolute) 3,257 1,500 - 7,800 cells/uL    Lymphocytes (Absolute) 2,022 850 - 3,900 cells/uL    Monocytes (Absolute) 706 200 - 950 cells/uL    Eosinophils (Absolute) 277 15 - 500 cells/uL    Basophils (Absolute) 38 0 - 200 cells/uL    Neutrophils 51 7 %    Lymphocytes 32 1 %    Monocytes 11 2 %    Eosinophils 4 4 %    Basophils 0 6 %   T3    Collection Time: 07/06/18  7:00 AM   Result Value Ref Range    Triiodothyronine (T3) 87 76 - 181 ng/dL   T4, free    Collection Time: 07/06/18  7:00 AM   Result Value Ref Range    Free t4 1 3 0 8 - 1 8 ng/dL   TSH, 3rd generation    Collection Time: 07/06/18  7:00 AM   Result Value Ref Range    TSH 3 81 0 40 - 4 50 mIU/L   PSA, Total Screen    Collection Time: 07/06/18  7:00 AM   Result Value Ref Range    Prostate Specific Antigen Total 2 4 < OR = 4 0 ng/mL       Assessment/Plan:    No problem-specific Assessment & Plan notes found for this encounter  Problem List Items Addressed This Visit     Hyperlipidemia      Markedly improved with improvement of thyroid function studies  No need for medication at this time  Repeat lipid profile in 6 months         Relevant Orders    Lipid panel    Hypothyroidism      Well controlled on levothyroxine 112 mcg once daily  Check TSH and reflex T4 in 6 months         Relevant Medications    levothyroxine 112 mcg tablet    Other Relevant Orders    TSH, 3rd generation with Free T4 reflex    Encounter for annual physical exam - Primary      Physical examination completed today  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

## 2018-07-16 NOTE — ASSESSMENT & PLAN NOTE
Markedly improved with improvement of thyroid function studies  No need for medication at this time    Repeat lipid profile in 6 months

## 2018-08-14 ENCOUNTER — TELEPHONE (OUTPATIENT)
Dept: FAMILY MEDICINE CLINIC | Facility: CLINIC | Age: 58
End: 2018-08-14

## 2018-08-14 NOTE — TELEPHONE ENCOUNTER
Patient called in to find out if his forms from his physical were ever completed and faxed for his insurance  I let him know that I would re print the forms and put them on Chetna's desk because both Norlin Simmonds and Dr Patrice Brunner were not here when he called so I could not ask them  He would like a call back when the forms are completed and faxed

## 2018-08-16 NOTE — TELEPHONE ENCOUNTER
Form was completed and faxed  The form had been faxed a few times and patient was advised that if form was not received to please verify the fax number  He agreed

## 2019-01-11 DIAGNOSIS — E03.9 HYPOTHYROIDISM, UNSPECIFIED TYPE: ICD-10-CM

## 2019-01-11 RX ORDER — LEVOTHYROXINE SODIUM 112 UG/1
112 TABLET ORAL DAILY
Qty: 90 TABLET | Refills: 0 | Status: SHIPPED | OUTPATIENT
Start: 2019-01-11 | End: 2019-01-30 | Stop reason: SDUPTHER

## 2019-01-11 RX ORDER — LEVOTHYROXINE SODIUM 112 UG/1
112 TABLET ORAL DAILY
Qty: 90 TABLET | Refills: 0 | Status: CANCELLED | OUTPATIENT
Start: 2019-01-11

## 2019-01-11 NOTE — TELEPHONE ENCOUNTER
Refill levothyroxine    Please let the patient know that he is due for follow-up blood work prior to his follow-up appointment on January 21st

## 2019-01-16 DIAGNOSIS — E03.9 HYPOTHYROIDISM, UNSPECIFIED TYPE: ICD-10-CM

## 2019-01-16 LAB
CHOLEST SERPL-MCNC: 219 MG/DL
CHOLEST/HDLC SERPL: 4.2 (CALC)
HDLC SERPL-MCNC: 52 MG/DL
LDLC SERPL CALC-MCNC: 146 MG/DL (CALC)
NONHDLC SERPL-MCNC: 167 MG/DL (CALC)
PSA FREE MFR SERPL: 23 % (CALC)
PSA FREE SERPL-MCNC: 0.3 NG/ML
PSA SERPL-MCNC: 1.3 NG/ML
T4 FREE SERPL-MCNC: 1.5 NG/DL (ref 0.8–1.8)
TRIGL SERPL-MCNC: 99 MG/DL
TSH SERPL-ACNC: 5.03 MIU/L (ref 0.4–4.5)

## 2019-01-16 RX ORDER — LEVOTHYROXINE SODIUM 112 UG/1
TABLET ORAL
Qty: 90 TABLET | Refills: 1 | OUTPATIENT
Start: 2019-01-16

## 2019-01-16 NOTE — TELEPHONE ENCOUNTER
Prescription was refilled and sent to his pharmacy, [address] [place] [address] in [place]    Why am I getting request from [facility]

## 2019-01-30 ENCOUNTER — OFFICE VISIT (OUTPATIENT)
Dept: FAMILY MEDICINE CLINIC | Facility: CLINIC | Age: 59
End: 2019-01-30
Payer: COMMERCIAL

## 2019-01-30 VITALS
SYSTOLIC BLOOD PRESSURE: 138 MMHG | HEIGHT: 73 IN | HEART RATE: 74 BPM | OXYGEN SATURATION: 97 % | DIASTOLIC BLOOD PRESSURE: 68 MMHG | WEIGHT: 207 LBS | RESPIRATION RATE: 16 BRPM | BODY MASS INDEX: 27.43 KG/M2

## 2019-01-30 DIAGNOSIS — E78.2 MIXED HYPERLIPIDEMIA: Primary | ICD-10-CM

## 2019-01-30 DIAGNOSIS — Z12.5 PROSTATE CANCER SCREENING: ICD-10-CM

## 2019-01-30 DIAGNOSIS — E03.9 HYPOTHYROIDISM, UNSPECIFIED TYPE: ICD-10-CM

## 2019-01-30 PROBLEM — R97.20 INCREASED PROSTATE SPECIFIC ANTIGEN (PSA) VELOCITY: Status: RESOLVED | Noted: 2018-07-16 | Resolved: 2019-01-30

## 2019-01-30 PROCEDURE — 99213 OFFICE O/P EST LOW 20 MIN: CPT | Performed by: FAMILY MEDICINE

## 2019-01-30 PROCEDURE — 3008F BODY MASS INDEX DOCD: CPT | Performed by: FAMILY MEDICINE

## 2019-01-30 PROCEDURE — 1036F TOBACCO NON-USER: CPT | Performed by: FAMILY MEDICINE

## 2019-01-30 RX ORDER — LEVOTHYROXINE SODIUM 0.12 MG/1
125 TABLET ORAL DAILY
Qty: 90 TABLET | Refills: 1 | Status: SHIPPED | OUTPATIENT
Start: 2019-01-30 | End: 2019-04-17

## 2019-01-30 NOTE — ASSESSMENT & PLAN NOTE
Patient did have significantly improved cholesterol 6 months ago  Advised on dietary modification  Normally when we get his hypothyroidism better controlled his cholesterol profile also improved as well

## 2019-01-30 NOTE — ASSESSMENT & PLAN NOTE
Not ideally controlled  TSH elevated  Increased dose of levothyroxine to 125 mcg once daily    Repeat thyroid function studies in 6 months

## 2019-01-30 NOTE — PROGRESS NOTES
Subjective:      Patient ID: Mallory Qiu is a 59 y o  male  Patient presents for 6 month follow-up of chronic conditions including hypothyroidism, hyperlipidemia  Patient did have elevation of his PSA  Repeat labs reviewed  PSA went from 2 4-1 3 after 6 months  Patient has no issues with urination  Cholesterol is slightly more elevated with total cholesterol 219, HDL 52,   Patient tries to eat a generally healthy diet but may have had some dietary indiscretion over the holidays  Free T4 1 5 with an elevated TSH of 5 03  Patient does not feel overall sluggish, tired, constipated  Generally feeling well  No particular complaints or concerns  Past Medical History:   Diagnosis Date    Disease of thyroid gland        Family History   Problem Relation Age of Onset    Hypertension Mother     Colon cancer Father         ascending    Heart attack Father         MI       Past Surgical History:   Procedure Laterality Date    [place] COLONOSCOPY FLX DX W/COLLJ SPEC WHEN PFRMD N/A 5/2/2016    Procedure: COLONOSCOPY;  Surgeon: Tara Griggs MD;  [facility]; Service: Gastroenterology    TONSILLECTOMY          reports that he has quit smoking  He has never used smokeless tobacco  He reports that he drinks alcohol  He reports that he does not use drugs  Current Outpatient Prescriptions:     levothyroxine 125 mcg tablet, Take 1 tablet (125 mcg total) by mouth daily, Disp: 90 tablet, Rfl: 1    The following portions of the patient's history were reviewed and updated as appropriate: allergies, current medications, past family history, past medical history, past social history, past surgical history and problem list     Review of Systems   Constitutional: Positive for unexpected weight change (9 lb weight gain over the holidays)  HENT: Negative  Eyes: Negative  Respiratory: Negative  Cardiovascular: Negative  Gastrointestinal: Negative  Endocrine: Negative  Genitourinary: Negative  Musculoskeletal: Negative  Skin: Negative  Allergic/Immunologic: Negative  Neurological: Negative  Hematological: Negative  Psychiatric/Behavioral: Negative  All other systems reviewed and are negative  Objective:    /68   Pulse 74   Resp 16   Ht 6' 1" (1 854 m)   Wt 93 9 kg (207 lb)   SpO2 97%   BMI 27 31 kg/m²      Physical Exam   Constitutional: He is oriented to person, place, and time  He appears well-developed and well-nourished  HENT:   Head: Normocephalic  Eyes: Pupils are equal, round, and reactive to light  Conjunctivae and EOM are normal    Neck: Normal range of motion  Neck supple  Cardiovascular: Normal rate, regular rhythm and normal heart sounds  Pulmonary/Chest: Effort normal and breath sounds normal    Abdominal: Soft  Bowel sounds are normal    Musculoskeletal: Normal range of motion  Neurological: He is alert and oriented to person, place, and time  Psychiatric: He has a normal mood and affect  His behavior is normal  Judgment and thought content normal    Nursing note and vitals reviewed          Recent Results (from the past 1008 hour(s))   Lipid panel    Collection Time: 01/15/19  3:02 PM   Result Value Ref Range    Total Cholesterol 219 (H) <200 mg/dL    HDL 52 >40 mg/dL    Triglycerides 99 <150 mg/dL    LDL Direct 146 (H) mg/dL (calc)    Chol HDLC Ratio 4 2 <5 0 (calc)    Non-HDL Cholesterol 167 (H) <130 mg/dL (calc)   T4, free    Collection Time: 01/15/19  3:02 PM   Result Value Ref Range    Free t4 1 5 0 8 - 1 8 ng/dL   TSH, 3rd generation with Free T4 reflex    Collection Time: 01/15/19  3:02 PM   Result Value Ref Range    TSH W/RFX TO FREE T4 5 03 (H) 0 40 - 4 50 mIU/L   PSA, total and free    Collection Time: 01/15/19  3:02 PM   Result Value Ref Range    Prostate Specific Antigen Total 1 3 < OR = 4 0 ng/mL    PSA, Free 0 3 ng/mL    PSA, Free Pct 23 (L) >25 % (calc) Assessment/Plan:    Hypothyroidism  Not ideally controlled  TSH elevated  Increased dose of levothyroxine to 125 mcg once daily  Repeat thyroid function studies in 6 months    Hyperlipidemia  Patient did have significantly improved cholesterol 6 months ago  Advised on dietary modification  Normally when we get his hypothyroidism better controlled his cholesterol profile also improved as well  Problem List Items Addressed This Visit     Hyperlipidemia - Primary     Patient did have significantly improved cholesterol 6 months ago  Advised on dietary modification  Normally when we get his hypothyroidism better controlled his cholesterol profile also improved as well  Relevant Orders    Comprehensive metabolic panel    Lipid panel    Hypothyroidism     Not ideally controlled  TSH elevated  Increased dose of levothyroxine to 125 mcg once daily    Repeat thyroid function studies in 6 months         Relevant Medications    levothyroxine 125 mcg tablet    Other Relevant Orders    CBC and differential    TSH, 3rd generation with Free T4 reflex      Other Visit Diagnoses     Prostate cancer screening        Relevant Orders    PSA, Total Screen

## 2019-04-14 DIAGNOSIS — E03.9 HYPOTHYROIDISM, UNSPECIFIED TYPE: ICD-10-CM

## 2019-04-14 RX ORDER — LEVOTHYROXINE SODIUM 112 UG/1
TABLET ORAL
Qty: 90 TABLET | Refills: 0 | Status: SHIPPED | OUTPATIENT
Start: 2019-04-14 | End: 2019-04-17 | Stop reason: SDUPTHER

## 2019-04-17 DIAGNOSIS — E03.9 HYPOTHYROIDISM, UNSPECIFIED TYPE: ICD-10-CM

## 2019-04-17 RX ORDER — LEVOTHYROXINE SODIUM 112 UG/1
112 TABLET ORAL DAILY
Qty: 90 TABLET | Refills: 0 | Status: SHIPPED | OUTPATIENT
Start: 2019-04-17 | End: 2019-07-31

## 2019-07-30 DIAGNOSIS — E03.9 HYPOTHYROIDISM, UNSPECIFIED TYPE: ICD-10-CM

## 2019-07-31 ENCOUNTER — TELEPHONE (OUTPATIENT)
Dept: FAMILY MEDICINE CLINIC | Facility: CLINIC | Age: 59
End: 2019-07-31

## 2019-07-31 RX ORDER — LEVOTHYROXINE SODIUM 0.12 MG/1
TABLET ORAL
Qty: 10 TABLET | Refills: 0 | Status: SHIPPED | OUTPATIENT
Start: 2019-07-31 | End: 2019-08-08 | Stop reason: SDUPTHER

## 2019-07-31 NOTE — TELEPHONE ENCOUNTER
PT STATES HE IS CURRENTLY TAKING 125    HE IS HAVING LABS DONE TOMORROW AND IS REQUESTING A FEW TABS UNTIL APT

## 2019-07-31 NOTE — TELEPHONE ENCOUNTER
Please contact the patient  As of his last follow-up appointment in January he was supposed to be on increased dose of levothyroxine 125 mcg once daily for some reason in April his 112 mcg tablets were refilled  He is due for follow-up August 8th him is supposed to be having labs completed for follow-up    Needs to check his prescription to see what dose of levothyroxine he is actually taking whether it is 112 or 125 mcg

## 2019-08-02 LAB
ALBUMIN SERPL-MCNC: 4.2 G/DL (ref 3.6–5.1)
ALBUMIN/GLOB SERPL: 1.8 (CALC) (ref 1–2.5)
ALP SERPL-CCNC: 47 U/L (ref 40–115)
ALT SERPL-CCNC: 21 U/L (ref 9–46)
AST SERPL-CCNC: 16 U/L (ref 10–35)
BASOPHILS # BLD AUTO: 48 CELLS/UL (ref 0–200)
BASOPHILS NFR BLD AUTO: 1 %
BILIRUB SERPL-MCNC: 1.3 MG/DL (ref 0.2–1.2)
BUN SERPL-MCNC: 12 MG/DL (ref 7–25)
BUN/CREAT SERPL: ABNORMAL (CALC) (ref 6–22)
CALCIUM SERPL-MCNC: 9.1 MG/DL (ref 8.6–10.3)
CHLORIDE SERPL-SCNC: 105 MMOL/L (ref 98–110)
CHOLEST SERPL-MCNC: 168 MG/DL
CHOLEST/HDLC SERPL: 3.9 (CALC)
CO2 SERPL-SCNC: 29 MMOL/L (ref 20–32)
CREAT SERPL-MCNC: 0.98 MG/DL (ref 0.7–1.25)
EOSINOPHIL # BLD AUTO: 202 CELLS/UL (ref 15–500)
EOSINOPHIL NFR BLD AUTO: 4.2 %
ERYTHROCYTE [DISTWIDTH] IN BLOOD BY AUTOMATED COUNT: 13.5 % (ref 11–15)
GLOBULIN SER CALC-MCNC: 2.4 G/DL (CALC) (ref 1.9–3.7)
GLUCOSE SERPL-MCNC: 88 MG/DL (ref 65–99)
HCT VFR BLD AUTO: 44.9 % (ref 38.5–50)
HDLC SERPL-MCNC: 43 MG/DL
HGB BLD-MCNC: 15.1 G/DL (ref 13.2–17.1)
LDLC SERPL CALC-MCNC: 105 MG/DL (CALC)
LYMPHOCYTES # BLD AUTO: 1752 CELLS/UL (ref 850–3900)
LYMPHOCYTES NFR BLD AUTO: 36.5 %
MCH RBC QN AUTO: 30.6 PG (ref 27–33)
MCHC RBC AUTO-ENTMCNC: 33.6 G/DL (ref 32–36)
MCV RBC AUTO: 90.9 FL (ref 80–100)
MONOCYTES # BLD AUTO: 562 CELLS/UL (ref 200–950)
MONOCYTES NFR BLD AUTO: 11.7 %
NEUTROPHILS # BLD AUTO: 2237 CELLS/UL (ref 1500–7800)
NEUTROPHILS NFR BLD AUTO: 46.6 %
NONHDLC SERPL-MCNC: 125 MG/DL (CALC)
PLATELET # BLD AUTO: 249 THOUSAND/UL (ref 140–400)
PMV BLD REES-ECKER: 9.7 FL (ref 7.5–12.5)
POTASSIUM SERPL-SCNC: 4.2 MMOL/L (ref 3.5–5.3)
PROT SERPL-MCNC: 6.6 G/DL (ref 6.1–8.1)
PSA SERPL-MCNC: 1.2 NG/ML
RBC # BLD AUTO: 4.94 MILLION/UL (ref 4.2–5.8)
SL AMB EGFR AFRICAN AMERICAN: 96 ML/MIN/1.73M2
SL AMB EGFR NON AFRICAN AMERICAN: 83 ML/MIN/1.73M2
SODIUM SERPL-SCNC: 138 MMOL/L (ref 135–146)
TRIGL SERPL-MCNC: 103 MG/DL
TSH SERPL-ACNC: 3.08 MIU/L (ref 0.4–4.5)
WBC # BLD AUTO: 4.8 THOUSAND/UL (ref 3.8–10.8)

## 2019-08-08 ENCOUNTER — OFFICE VISIT (OUTPATIENT)
Dept: FAMILY MEDICINE CLINIC | Facility: CLINIC | Age: 59
End: 2019-08-08
Payer: COMMERCIAL

## 2019-08-08 VITALS
OXYGEN SATURATION: 98 % | DIASTOLIC BLOOD PRESSURE: 68 MMHG | RESPIRATION RATE: 16 BRPM | SYSTOLIC BLOOD PRESSURE: 128 MMHG | BODY MASS INDEX: 25.71 KG/M2 | HEART RATE: 72 BPM | HEIGHT: 73 IN | WEIGHT: 194 LBS

## 2019-08-08 DIAGNOSIS — Z00.00 ENCOUNTER FOR ANNUAL PHYSICAL EXAM: Primary | ICD-10-CM

## 2019-08-08 DIAGNOSIS — E03.9 HYPOTHYROIDISM, UNSPECIFIED TYPE: ICD-10-CM

## 2019-08-08 DIAGNOSIS — E78.2 MIXED HYPERLIPIDEMIA: ICD-10-CM

## 2019-08-08 PROBLEM — E78.5 HYPERLIPIDEMIA: Status: RESOLVED | Noted: 2017-02-06 | Resolved: 2019-08-08

## 2019-08-08 PROCEDURE — 99396 PREV VISIT EST AGE 40-64: CPT | Performed by: FAMILY MEDICINE

## 2019-08-08 RX ORDER — LEVOTHYROXINE SODIUM 0.12 MG/1
125 TABLET ORAL DAILY
Qty: 90 TABLET | Refills: 1 | Status: SHIPPED | OUTPATIENT
Start: 2019-08-08 | End: 2020-01-30 | Stop reason: SDUPTHER

## 2019-08-08 NOTE — PROGRESS NOTES
Subjective:      Patient ID: Edyth Seip is a 62 y o  male  55-year-old male presents for annual physical examination and follow-up of hypothyroidism  Patient did have elevated cholesterol on his last visit 6 months ago  Patient has made significant dietary changes  He has almost entirely eliminated red meat  He has lost 13 lb and his cholesterol has improved tremendously  Total cholesterol 168, HDL 43,   This is the best cholesterol that he has had in many years  Hypothyroidism is well controlled with TSH of 3 08 on levothyroxine 125 mcg once daily  No particular complaints or concerns  Patient feels well  Every now and then he was getting a twinge in his right hip but with his weight loss that has not even been occurring  PSA actually is trending downwards from a high of 2 42 years ago      Past Medical History:   Diagnosis Date    Disease of thyroid gland        Family History   Problem Relation Age of Onset    Hypertension Mother     Colon cancer Father         ascending    Heart attack Father         MI       Past Surgical History:   Procedure Laterality Date    [place] COLONOSCOPY FLX DX W/COLLJ SPEC WHEN PFRMD N/A 5/2/2016    Procedure: COLONOSCOPY;  Surgeon: Nikunj Thompson MD;  [facility]; Service: Gastroenterology    TONSILLECTOMY          reports that he has quit smoking  He has never used smokeless tobacco  He reports that he drinks alcohol  He reports that he does not use drugs  Current Outpatient Medications:     levothyroxine 125 mcg tablet, Take 1 tablet (125 mcg total) by mouth daily, Disp: 90 tablet, Rfl: 1    The following portions of the patient's history were reviewed and updated as appropriate: allergies, current medications, past family history, past medical history, past social history, past surgical history and problem list     Review of Systems   Constitutional: Negative  HENT: Negative  Eyes: Negative  Respiratory: Negative  Cardiovascular: Negative  Gastrointestinal: Negative  Endocrine: Negative  Genitourinary: Negative  Musculoskeletal: Negative  Skin: Negative  Allergic/Immunologic: Negative  Neurological: Negative  Hematological: Negative  Psychiatric/Behavioral: Negative  All other systems reviewed and are negative  Objective:    /68   Pulse 72   Resp 16   Ht 6' 1" (1 854 m)   Wt 88 kg (194 lb)   SpO2 98%   BMI 25 60 kg/m²      Physical Exam   Constitutional: He is oriented to person, place, and time  He appears well-developed and well-nourished  Physically fit  Not overweight   HENT:   Head: Normocephalic  Eyes: Pupils are equal, round, and reactive to light  Conjunctivae and EOM are normal    Neck: Normal range of motion  Neck supple  Cardiovascular: Normal rate, regular rhythm and normal heart sounds  Pulmonary/Chest: Effort normal and breath sounds normal    Abdominal: Soft  Bowel sounds are normal    Musculoskeletal: Normal range of motion  Neurological: He is alert and oriented to person, place, and time  Psychiatric: He has a normal mood and affect  His behavior is normal  Judgment and thought content normal    Nursing note and vitals reviewed          Recent Results (from the past 1008 hour(s))   Lipid panel    Collection Time: 08/01/19  7:56 AM   Result Value Ref Range    Total Cholesterol 168 <200 mg/dL    HDL 43 >40 mg/dL    Triglycerides 103 <150 mg/dL    LDL Direct 105 (H) mg/dL (calc)    Chol HDLC Ratio 3 9 <5 0 (calc)    Non-HDL Cholesterol 125 <130 mg/dL (calc)    Comment(s)     Comprehensive metabolic panel    Collection Time: 08/01/19  7:56 AM   Result Value Ref Range    Glucose, Random 88 65 - 99 mg/dL    BUN 12 7 - 25 mg/dL    Creatinine 0 98 0 70 - 1 25 mg/dL    eGFR Non  83 > OR = 60 mL/min/1 73m2    eGFR  96 > OR = 60 mL/min/1 73m2    SL AMB BUN/CREATININE RATIO NOT APPLICABLE 6 - 22 (calc)    Sodium 138 135 - 146 mmol/L    Potassium 4 2 3 5 - 5 3 mmol/L    Chloride 105 98 - 110 mmol/L    CO2 29 20 - 32 mmol/L    SL AMB CALCIUM 9 1 8 6 - 10 3 mg/dL    Protein, Total 6 6 6 1 - 8 1 g/dL    Albumin 4 2 3 6 - 5 1 g/dL    Globulin 2 4 1 9 - 3 7 g/dL (calc)    Albumin/Globulin Ratio 1 8 1 0 - 2 5 (calc)    TOTAL BILIRUBIN 1 3 (H) 0 2 - 1 2 mg/dL    Alkaline Phosphatase 47 40 - 115 U/L    AST 16 10 - 35 U/L    ALT 21 9 - 46 U/L   CBC and differential    Collection Time: 08/01/19  7:56 AM   Result Value Ref Range    White Blood Cell Count 4 8 3 8 - 10 8 Thousand/uL    Red Blood Cell Count 4 94 4 20 - 5 80 Million/uL    Hemoglobin 15 1 13 2 - 17 1 g/dL    HCT 44 9 38 5 - 50 0 %    MCV 90 9 80 0 - 100 0 fL    MCH 30 6 27 0 - 33 0 pg    MCHC 33 6 32 0 - 36 0 g/dL    RDW 13 5 11 0 - 15 0 %    Platelet Count 044 956 - 400 Thousand/uL    SL AMB MPV 9 7 7 5 - 12 5 fL    Neutrophils (Absolute) 2,237 1,500 - 7,800 cells/uL    Lymphocytes (Absolute) 1,752 850 - 3,900 cells/uL    Monocytes (Absolute) 562 200 - 950 cells/uL    Eosinophils (Absolute) 202 15 - 500 cells/uL    Basophils ABS 48 0 - 200 cells/uL    Neutrophils 46 6 %    Lymphocytes 36 5 %    Monocytes 11 7 %    Eosinophils 4 2 %    Basophils PCT 1 0 %    Always Message     PSA, Total Screen    Collection Time: 08/01/19  7:56 AM   Result Value Ref Range    Prostate Specific Antigen Total 1 2 < OR = 4 0 ng/mL   TSH, 3rd generation with Free T4 reflex    Collection Time: 08/01/19  7:56 AM   Result Value Ref Range    TSH W/RFX TO FREE T4 3 08 0 40 - 4 50 mIU/L       Assessment/Plan:    Encounter for annual physical exam  - annual physical examination performed  Patient is current on screening studies    -patient is commended on his weight loss through increased activity and dietary changes  - offered Adacel booster  Patient declined at this time    Hypothyroidism  Hypothyroidism remains very well controlled on levothyroxine 125 mcg once daily  Continue same  Refill provided  Re-evaluate in 6 months          Problem List Items Addressed This Visit        Endocrine    Hypothyroidism     Hypothyroidism remains very well controlled on levothyroxine 125 mcg once daily  Continue same  Refill provided  Re-evaluate in 6 months         Relevant Medications    levothyroxine 125 mcg tablet    Other Relevant Orders    TSH, 3rd generation with Free T4 reflex       Other    Encounter for annual physical exam - Primary     - annual physical examination performed  Patient is current on screening studies    -patient is commended on his weight loss through increased activity and dietary changes  - offered Adacel booster  Patient declined at this time         RESOLVED: Hyperlipidemia    Relevant Orders    Comprehensive metabolic panel    Lipid panel          BMI Counseling: Body mass index is 25 6 kg/m²  Discussed the patient's BMI with him  The BMI is above average  BMI counseling and education was provided to the patient  Nutrition recommendations include reducing intake of cholesterol  advised to keep on doing what he is doing as he has lost weight and his cholesterol has improved

## 2019-08-08 NOTE — ASSESSMENT & PLAN NOTE
- annual physical examination performed  Patient is current on screening studies    -patient is commended on his weight loss through increased activity and dietary changes  - offered Adacel booster    Patient declined at this time

## 2020-01-30 DIAGNOSIS — E03.9 HYPOTHYROIDISM, UNSPECIFIED TYPE: ICD-10-CM

## 2020-01-31 RX ORDER — LEVOTHYROXINE SODIUM 0.12 MG/1
125 TABLET ORAL DAILY
Qty: 90 TABLET | Refills: 1 | Status: SHIPPED | OUTPATIENT
Start: 2020-01-31 | End: 2020-07-24 | Stop reason: SDUPTHER

## 2020-02-14 LAB
ALBUMIN SERPL-MCNC: 4.2 G/DL (ref 3.6–5.1)
ALBUMIN/GLOB SERPL: 1.7 (CALC) (ref 1–2.5)
ALP SERPL-CCNC: 48 U/L (ref 35–144)
ALT SERPL-CCNC: 19 U/L (ref 9–46)
AST SERPL-CCNC: 15 U/L (ref 10–35)
BILIRUB SERPL-MCNC: 1.4 MG/DL (ref 0.2–1.2)
BUN SERPL-MCNC: 14 MG/DL (ref 7–25)
BUN/CREAT SERPL: ABNORMAL (CALC) (ref 6–22)
CALCIUM SERPL-MCNC: 9.2 MG/DL (ref 8.6–10.3)
CHLORIDE SERPL-SCNC: 104 MMOL/L (ref 98–110)
CHOLEST SERPL-MCNC: 192 MG/DL
CHOLEST/HDLC SERPL: 4.2 (CALC)
CO2 SERPL-SCNC: 28 MMOL/L (ref 20–32)
CREAT SERPL-MCNC: 0.98 MG/DL (ref 0.7–1.25)
GLOBULIN SER CALC-MCNC: 2.5 G/DL (CALC) (ref 1.9–3.7)
GLUCOSE SERPL-MCNC: 86 MG/DL (ref 65–99)
HDLC SERPL-MCNC: 46 MG/DL
LDLC SERPL CALC-MCNC: 123 MG/DL (CALC)
NONHDLC SERPL-MCNC: 146 MG/DL (CALC)
POTASSIUM SERPL-SCNC: 4.2 MMOL/L (ref 3.5–5.3)
PROT SERPL-MCNC: 6.7 G/DL (ref 6.1–8.1)
SL AMB EGFR AFRICAN AMERICAN: 96 ML/MIN/1.73M2
SL AMB EGFR NON AFRICAN AMERICAN: 83 ML/MIN/1.73M2
SODIUM SERPL-SCNC: 139 MMOL/L (ref 135–146)
TRIGL SERPL-MCNC: 115 MG/DL
TSH SERPL-ACNC: 2.54 MIU/L (ref 0.4–4.5)

## 2020-03-05 ENCOUNTER — OFFICE VISIT (OUTPATIENT)
Dept: FAMILY MEDICINE CLINIC | Facility: CLINIC | Age: 60
End: 2020-03-05
Payer: COMMERCIAL

## 2020-03-05 VITALS
HEART RATE: 74 BPM | DIASTOLIC BLOOD PRESSURE: 78 MMHG | WEIGHT: 198 LBS | BODY MASS INDEX: 26.24 KG/M2 | SYSTOLIC BLOOD PRESSURE: 126 MMHG | RESPIRATION RATE: 16 BRPM | OXYGEN SATURATION: 98 % | HEIGHT: 73 IN

## 2020-03-05 DIAGNOSIS — Z00.00 PHYSICAL EXAM, ANNUAL: ICD-10-CM

## 2020-03-05 DIAGNOSIS — L71.9 ROSACEA: ICD-10-CM

## 2020-03-05 DIAGNOSIS — E03.9 HYPOTHYROIDISM, UNSPECIFIED TYPE: Primary | ICD-10-CM

## 2020-03-05 DIAGNOSIS — Z12.5 PROSTATE CANCER SCREENING: ICD-10-CM

## 2020-03-05 PROCEDURE — 99214 OFFICE O/P EST MOD 30 MIN: CPT | Performed by: FAMILY MEDICINE

## 2020-03-05 PROCEDURE — 3008F BODY MASS INDEX DOCD: CPT | Performed by: FAMILY MEDICINE

## 2020-03-05 PROCEDURE — 1036F TOBACCO NON-USER: CPT | Performed by: FAMILY MEDICINE

## 2020-03-05 NOTE — ASSESSMENT & PLAN NOTE
Hypothyroidism remains very well controlled on levothyroxine 125 mcg once daily  Continue same  Refill not necessary    Recheck thyroid function study in 6 months

## 2020-03-05 NOTE — PROGRESS NOTES
Subjective:      Patient ID: Shaun Dunn is a 62 y o  male  49-year-old male presents for follow-up of chronic conditions including hypothyroidism  Patient has been working very long hours for the last 5 weeks  Patient has been working 12 hours days 5-6 days per week at work  Does feel quite tired  He has gained some weight especially with that hectic work schedule  Patient did have labs performed which showed normal TSH, mildly elevated cholesterol profile but still within normal limits, normal CMP  Patient has been feeling tired with work but overall well  He does have redness, irritation of the face on bilateral malar prominences as well as knows that seems to be consistent with rosacea  Patient has been trying to put moisturizer on his face but without any significant relief      Past Medical History:   Diagnosis Date    Disease of thyroid gland        Family History   Problem Relation Age of Onset    Hypertension Mother     Colon cancer Father         ascending    Heart attack Father         MI       Past Surgical History:   Procedure Laterality Date    [place] COLONOSCOPY FLX DX W/COLLJ SPEC WHEN PFRMD N/A 5/2/2016    Procedure: COLONOSCOPY;  Surgeon: Tristan Guidry MD;  Location: [facility]; Service: Gastroenterology    TONSILLECTOMY          reports that he has quit smoking  He has never used smokeless tobacco  He reports that he drinks alcohol  He reports that he does not use drugs        Current Outpatient Medications:     levothyroxine 125 mcg tablet, Take 1 tablet (125 mcg total) by mouth daily, Disp: 90 tablet, Rfl: 1    metroNIDAZOLE (METROCREAM) 0 75 % cream, Apply topically 2 (two) times a day, Disp: 45 g, Rfl: 0    The following portions of the patient's history were reviewed and updated as appropriate: allergies, current medications, past family history, past medical history, past social history, past surgical history and problem list     Review of Systems   Constitutional: Positive for fatigue  HENT: Negative  Eyes: Negative  Respiratory: Negative  Cardiovascular: Negative  Gastrointestinal: Negative  Endocrine: Negative  Genitourinary: Negative  Musculoskeletal: Negative  Skin: Positive for rash (Face)  Allergic/Immunologic: Negative  Neurological: Negative  Hematological: Negative  Psychiatric/Behavioral: Negative  All other systems reviewed and are negative  Objective:    /78   Pulse 74   Resp 16   Ht 6' 1" (1 854 m)   Wt 89 8 kg (198 lb)   SpO2 98%   BMI 26 12 kg/m²      Physical Exam   Constitutional: He is oriented to person, place, and time  He appears well-developed and well-nourished  HENT:   Head: Normocephalic and atraumatic  Right Ear: External ear normal    Left Ear: External ear normal    Nose: Nose normal    Mouth/Throat: Oropharynx is clear and moist    Eyes: Pupils are equal, round, and reactive to light  Conjunctivae and EOM are normal    Neck: Normal range of motion  Neck supple  No thyromegaly present  Cardiovascular: Normal rate, regular rhythm and normal heart sounds  No murmur heard  Pulmonary/Chest: Effort normal and breath sounds normal    Abdominal: Soft  Bowel sounds are normal    Musculoskeletal: Normal range of motion  He exhibits no edema  Neurological: He is alert and oriented to person, place, and time  Skin: Rash noted  Psychiatric: He has a normal mood and affect  His behavior is normal  Judgment and thought content normal    Nursing note and vitals reviewed          Recent Results (from the past 1008 hour(s))   Lipid panel    Collection Time: 02/13/20  7:44 AM   Result Value Ref Range    Total Cholesterol 192 <200 mg/dL    HDL 46 > OR = 40 mg/dL    Triglycerides 115 <150 mg/dL    LDL Direct 123 (H) mg/dL (calc)    Chol HDLC Ratio 4 2 <5 0 (calc)    Non-HDL Cholesterol 146 (H) <130 mg/dL (calc)   Comprehensive metabolic panel    Collection Time: 02/13/20  7:44 AM   Result Value Ref Range    Glucose, Random 86 65 - 99 mg/dL    BUN 14 7 - 25 mg/dL    Creatinine 0 98 0 70 - 1 25 mg/dL    eGFR Non  83 > OR = 60 mL/min/1 73m2    eGFR  96 > OR = 60 mL/min/1 73m2    SL AMB BUN/CREATININE RATIO NOT APPLICABLE 6 - 22 (calc)    Sodium 139 135 - 146 mmol/L    Potassium 4 2 3 5 - 5 3 mmol/L    Chloride 104 98 - 110 mmol/L    CO2 28 20 - 32 mmol/L    Calcium 9 2 8 6 - 10 3 mg/dL    Protein, Total 6 7 6 1 - 8 1 g/dL    Albumin 4 2 3 6 - 5 1 g/dL    Globulin 2 5 1 9 - 3 7 g/dL (calc)    Albumin/Globulin Ratio 1 7 1 0 - 2 5 (calc)    TOTAL BILIRUBIN 1 4 (H) 0 2 - 1 2 mg/dL    Alkaline Phosphatase 48 35 - 144 U/L    AST 15 10 - 35 U/L    ALT 19 9 - 46 U/L   TSH, 3rd generation with Free T4 reflex    Collection Time: 02/13/20  7:44 AM   Result Value Ref Range    TSH W/RFX TO FREE T4 2 54 0 40 - 4 50 mIU/L       Assessment/Plan:    Hypothyroidism  Hypothyroidism remains very well controlled on levothyroxine 125 mcg once daily  Continue same  Refill not necessary  Recheck thyroid function study in 6 months    Rosacea  Patient has what I believe to be acneiform rosacea of the face  He has tried to apply moisturizers  I did provide a prescription for metronidazole cream   Hopefully this is covered by his insurance  If it is not covered by his insurance then I did recommend trying to apply Eucerin ointment          Problem List Items Addressed This Visit        Endocrine    Hypothyroidism - Primary     Hypothyroidism remains very well controlled on levothyroxine 125 mcg once daily  Continue same  Refill not necessary  Recheck thyroid function study in 6 months         Relevant Orders    TSH, 3rd generation with Free T4 reflex       Musculoskeletal and Integument    Rosacea     Patient has what I believe to be acneiform rosacea of the face  He has tried to apply moisturizers    I did provide a prescription for metronidazole cream   Hopefully this is covered by his insurance    If it is not covered by his insurance then I did recommend trying to apply Eucerin ointment         Relevant Medications    metroNIDAZOLE (METROCREAM) 0 75 % cream      Other Visit Diagnoses     Physical exam, annual        Relevant Orders    CBC and differential    Comprehensive metabolic panel    Lipid panel    TSH, 3rd generation with Free T4 reflex    Prostate cancer screening        Relevant Orders    PSA, Total Screen

## 2020-03-05 NOTE — ASSESSMENT & PLAN NOTE
Patient has what I believe to be acneiform rosacea of the face  He has tried to apply moisturizers  I did provide a prescription for metronidazole cream   Hopefully this is covered by his insurance    If it is not covered by his insurance then I did recommend trying to apply Eucerin ointment

## 2020-07-24 DIAGNOSIS — E03.9 HYPOTHYROIDISM, UNSPECIFIED TYPE: ICD-10-CM

## 2020-07-24 RX ORDER — LEVOTHYROXINE SODIUM 0.12 MG/1
125 TABLET ORAL DAILY
Qty: 30 TABLET | Refills: 0 | Status: SHIPPED | OUTPATIENT
Start: 2020-07-24 | End: 2020-07-24 | Stop reason: SDUPTHER

## 2020-07-24 RX ORDER — LEVOTHYROXINE SODIUM 0.12 MG/1
125 TABLET ORAL DAILY
Qty: 90 TABLET | Refills: 0 | Status: SHIPPED | OUTPATIENT
Start: 2020-07-24 | End: 2020-10-02 | Stop reason: SDUPTHER

## 2020-09-17 LAB
ALBUMIN SERPL-MCNC: 4.2 G/DL (ref 3.6–5.1)
ALBUMIN/GLOB SERPL: 1.6 (CALC) (ref 1–2.5)
ALP SERPL-CCNC: 40 U/L (ref 35–144)
ALT SERPL-CCNC: 19 U/L (ref 9–46)
AST SERPL-CCNC: 17 U/L (ref 10–35)
BASOPHILS # BLD AUTO: 42 CELLS/UL (ref 0–200)
BASOPHILS NFR BLD AUTO: 1 %
BILIRUB SERPL-MCNC: 1 MG/DL (ref 0.2–1.2)
BUN SERPL-MCNC: 15 MG/DL (ref 7–25)
BUN/CREAT SERPL: NORMAL (CALC) (ref 6–22)
CALCIUM SERPL-MCNC: 9 MG/DL (ref 8.6–10.3)
CHLORIDE SERPL-SCNC: 105 MMOL/L (ref 98–110)
CHOLEST SERPL-MCNC: 204 MG/DL
CHOLEST/HDLC SERPL: 4.1 (CALC)
CO2 SERPL-SCNC: 29 MMOL/L (ref 20–32)
CREAT SERPL-MCNC: 1.06 MG/DL (ref 0.7–1.25)
EOSINOPHIL # BLD AUTO: 210 CELLS/UL (ref 15–500)
EOSINOPHIL NFR BLD AUTO: 5 %
ERYTHROCYTE [DISTWIDTH] IN BLOOD BY AUTOMATED COUNT: 13.4 % (ref 11–15)
GLOBULIN SER CALC-MCNC: 2.6 G/DL (CALC) (ref 1.9–3.7)
GLUCOSE SERPL-MCNC: 93 MG/DL (ref 65–99)
HCT VFR BLD AUTO: 46.1 % (ref 38.5–50)
HDLC SERPL-MCNC: 50 MG/DL
HGB BLD-MCNC: 15 G/DL (ref 13.2–17.1)
LDLC SERPL CALC-MCNC: 132 MG/DL (CALC)
LYMPHOCYTES # BLD AUTO: 1466 CELLS/UL (ref 850–3900)
LYMPHOCYTES NFR BLD AUTO: 34.9 %
MCH RBC QN AUTO: 30.1 PG (ref 27–33)
MCHC RBC AUTO-ENTMCNC: 32.5 G/DL (ref 32–36)
MCV RBC AUTO: 92.6 FL (ref 80–100)
MONOCYTES # BLD AUTO: 449 CELLS/UL (ref 200–950)
MONOCYTES NFR BLD AUTO: 10.7 %
NEUTROPHILS # BLD AUTO: 2033 CELLS/UL (ref 1500–7800)
NEUTROPHILS NFR BLD AUTO: 48.4 %
NONHDLC SERPL-MCNC: 154 MG/DL (CALC)
PLATELET # BLD AUTO: 202 THOUSAND/UL (ref 140–400)
PMV BLD REES-ECKER: 10.1 FL (ref 7.5–12.5)
POTASSIUM SERPL-SCNC: 4.2 MMOL/L (ref 3.5–5.3)
PROT SERPL-MCNC: 6.8 G/DL (ref 6.1–8.1)
PSA SERPL-MCNC: 1.2 NG/ML
RBC # BLD AUTO: 4.98 MILLION/UL (ref 4.2–5.8)
SL AMB EGFR AFRICAN AMERICAN: 87 ML/MIN/1.73M2
SL AMB EGFR NON AFRICAN AMERICAN: 75 ML/MIN/1.73M2
SODIUM SERPL-SCNC: 140 MMOL/L (ref 135–146)
TRIGL SERPL-MCNC: 117 MG/DL
TSH SERPL-ACNC: 3.01 MIU/L (ref 0.4–4.5)
WBC # BLD AUTO: 4.2 THOUSAND/UL (ref 3.8–10.8)

## 2020-10-02 ENCOUNTER — OFFICE VISIT (OUTPATIENT)
Dept: FAMILY MEDICINE CLINIC | Facility: CLINIC | Age: 60
End: 2020-10-02
Payer: COMMERCIAL

## 2020-10-02 VITALS
HEART RATE: 72 BPM | SYSTOLIC BLOOD PRESSURE: 130 MMHG | DIASTOLIC BLOOD PRESSURE: 88 MMHG | OXYGEN SATURATION: 97 % | WEIGHT: 194.6 LBS | HEIGHT: 73 IN | TEMPERATURE: 98.2 F | RESPIRATION RATE: 18 BRPM | BODY MASS INDEX: 25.79 KG/M2

## 2020-10-02 DIAGNOSIS — Z23 ENCOUNTER FOR IMMUNIZATION: ICD-10-CM

## 2020-10-02 DIAGNOSIS — E78.9 BORDERLINE HIGH CHOLESTEROL: ICD-10-CM

## 2020-10-02 DIAGNOSIS — E03.9 HYPOTHYROIDISM, UNSPECIFIED TYPE: ICD-10-CM

## 2020-10-02 DIAGNOSIS — Z00.00 ENCOUNTER FOR ANNUAL PHYSICAL EXAM: Primary | ICD-10-CM

## 2020-10-02 PROCEDURE — 99396 PREV VISIT EST AGE 40-64: CPT | Performed by: FAMILY MEDICINE

## 2020-10-02 PROCEDURE — 3725F SCREEN DEPRESSION PERFORMED: CPT | Performed by: FAMILY MEDICINE

## 2020-10-02 PROCEDURE — 90682 RIV4 VACC RECOMBINANT DNA IM: CPT | Performed by: FAMILY MEDICINE

## 2020-10-02 PROCEDURE — 1036F TOBACCO NON-USER: CPT | Performed by: FAMILY MEDICINE

## 2020-10-02 PROCEDURE — 90471 IMMUNIZATION ADMIN: CPT | Performed by: FAMILY MEDICINE

## 2020-10-02 RX ORDER — LEVOTHYROXINE SODIUM 0.12 MG/1
125 TABLET ORAL DAILY
Qty: 90 TABLET | Refills: 3 | Status: SHIPPED | OUTPATIENT
Start: 2020-10-02 | End: 2021-07-19 | Stop reason: SDUPTHER

## 2021-02-02 ENCOUNTER — VBI (OUTPATIENT)
Dept: ADMINISTRATIVE | Facility: OTHER | Age: 61
End: 2021-02-02

## 2021-04-09 LAB
ALBUMIN SERPL-MCNC: 4.2 G/DL (ref 3.6–5.1)
ALBUMIN/GLOB SERPL: 1.6 (CALC) (ref 1–2.5)
ALP SERPL-CCNC: 46 U/L (ref 35–144)
ALT SERPL-CCNC: 21 U/L (ref 9–46)
AST SERPL-CCNC: 16 U/L (ref 10–35)
BILIRUB SERPL-MCNC: 1.3 MG/DL (ref 0.2–1.2)
BUN SERPL-MCNC: 19 MG/DL (ref 7–25)
BUN/CREAT SERPL: ABNORMAL (CALC) (ref 6–22)
CALCIUM SERPL-MCNC: 9 MG/DL (ref 8.6–10.3)
CHLORIDE SERPL-SCNC: 104 MMOL/L (ref 98–110)
CHOLEST SERPL-MCNC: 224 MG/DL
CHOLEST/HDLC SERPL: 5 (CALC)
CO2 SERPL-SCNC: 28 MMOL/L (ref 20–32)
CREAT SERPL-MCNC: 0.94 MG/DL (ref 0.7–1.25)
GLOBULIN SER CALC-MCNC: 2.7 G/DL (CALC) (ref 1.9–3.7)
GLUCOSE SERPL-MCNC: 83 MG/DL (ref 65–99)
HDLC SERPL-MCNC: 45 MG/DL
LDLC SERPL CALC-MCNC: 149 MG/DL (CALC)
NONHDLC SERPL-MCNC: 179 MG/DL (CALC)
POTASSIUM SERPL-SCNC: 4.1 MMOL/L (ref 3.5–5.3)
PROT SERPL-MCNC: 6.9 G/DL (ref 6.1–8.1)
SL AMB EGFR AFRICAN AMERICAN: 100 ML/MIN/1.73M2
SL AMB EGFR NON AFRICAN AMERICAN: 86 ML/MIN/1.73M2
SODIUM SERPL-SCNC: 138 MMOL/L (ref 135–146)
TRIGL SERPL-MCNC: 161 MG/DL
TSH SERPL-ACNC: 2.94 MIU/L (ref 0.4–4.5)

## 2021-04-15 ENCOUNTER — OFFICE VISIT (OUTPATIENT)
Dept: FAMILY MEDICINE CLINIC | Facility: CLINIC | Age: 61
End: 2021-04-15
Payer: COMMERCIAL

## 2021-04-15 VITALS
HEART RATE: 70 BPM | DIASTOLIC BLOOD PRESSURE: 74 MMHG | BODY MASS INDEX: 26.64 KG/M2 | WEIGHT: 201 LBS | OXYGEN SATURATION: 98 % | SYSTOLIC BLOOD PRESSURE: 122 MMHG | RESPIRATION RATE: 16 BRPM | HEIGHT: 73 IN

## 2021-04-15 DIAGNOSIS — K63.5 POLYP OF COLON, UNSPECIFIED PART OF COLON, UNSPECIFIED TYPE: ICD-10-CM

## 2021-04-15 DIAGNOSIS — E78.5 BORDERLINE HYPERLIPIDEMIA: ICD-10-CM

## 2021-04-15 DIAGNOSIS — E03.8 OTHER SPECIFIED HYPOTHYROIDISM: Primary | ICD-10-CM

## 2021-04-15 DIAGNOSIS — Z12.5 PROSTATE CANCER SCREENING: ICD-10-CM

## 2021-04-15 PROCEDURE — 1036F TOBACCO NON-USER: CPT | Performed by: FAMILY MEDICINE

## 2021-04-15 PROCEDURE — 3725F SCREEN DEPRESSION PERFORMED: CPT | Performed by: FAMILY MEDICINE

## 2021-04-15 PROCEDURE — 3008F BODY MASS INDEX DOCD: CPT | Performed by: FAMILY MEDICINE

## 2021-04-15 PROCEDURE — 99214 OFFICE O/P EST MOD 30 MIN: CPT | Performed by: FAMILY MEDICINE

## 2021-04-15 NOTE — ASSESSMENT & PLAN NOTE
Patient needs to watch dietary intake of fat and cholesterol  Recommend lifestyle modification  Repeat lipid profile to be done in 6 months    If cholesterol continues to increase then the patient will need medication but hopefully he can get this back down with diet

## 2021-04-15 NOTE — PROGRESS NOTES
Subjective:      Patient ID: Virgen Lawrence is a 59 y o  male  49-year-old male presents for 6 month follow-up of chronic conditions including hypothyroidism and borderline hyperlipidemia  Patient has gained some weight since last office visit  Some dietary indiscretion  Patient was also working 8 weeks of 12 hour shifts but hours are now getting back to normal   He is hopeful to loses weight  Generally eats healthy diet  Has been eating some more beef recently  Labs reviewed which showed TSH at 2 94, total cholesterol 224, HDL 45,   Normal CMP  Overall the patient is feeling well      Past Medical History:   Diagnosis Date    Disease of thyroid gland        Family History   Problem Relation Age of Onset    Hypertension Mother     Colon cancer Father         ascending    Heart attack Father         MI       Past Surgical History:   Procedure Laterality Date    [place] COLONOSCOPY FLX DX W/COLLJ SPEC WHEN PFRMD N/A 5/2/2016    Procedure: COLONOSCOPY;  Surgeon: Herb Duran MD;  [facility]; Service: Gastroenterology    TONSILLECTOMY          reports that he has quit smoking  He has never used smokeless tobacco  He reports current alcohol use  He reports that he does not use drugs  Current Outpatient Medications:     levothyroxine 125 mcg tablet, Take 1 tablet (125 mcg total) by mouth daily, Disp: 90 tablet, Rfl: 3    The following portions of the patient's history were reviewed and updated as appropriate: allergies, current medications, past family history, past medical history, past social history, past surgical history and problem list     Review of Systems   Constitutional: Positive for unexpected weight change (7 lb weight gain)  HENT: Negative  Eyes: Negative  Respiratory: Negative  Cardiovascular: Negative  Gastrointestinal: Negative  Endocrine: Negative  Genitourinary: Negative  Musculoskeletal: Negative  Skin: Negative      Allergic/Immunologic: Negative  Neurological: Negative  Hematological: Negative  Psychiatric/Behavioral: Negative  All other systems reviewed and are negative  Objective:    /74   Pulse 70   Resp 16   Ht 6' 1" (1 854 m)   Wt 91 2 kg (201 lb)   SpO2 98%   BMI 26 52 kg/m²      Physical Exam  Vitals signs and nursing note reviewed  Constitutional:       General: He is not in acute distress  Appearance: Normal appearance  He is well-developed and normal weight  He is not ill-appearing  HENT:      Head: Normocephalic and atraumatic  Right Ear: Tympanic membrane, ear canal and external ear normal       Left Ear: Tympanic membrane, ear canal and external ear normal    Eyes:      Extraocular Movements: Extraocular movements intact  Conjunctiva/sclera: Conjunctivae normal       Pupils: Pupils are equal, round, and reactive to light  Neck:      Musculoskeletal: Normal range of motion and neck supple  Cardiovascular:      Rate and Rhythm: Normal rate and regular rhythm  Pulses: Normal pulses  Heart sounds: Normal heart sounds  No murmur  Pulmonary:      Effort: Pulmonary effort is normal       Breath sounds: Normal breath sounds  Abdominal:      General: Abdomen is flat  Bowel sounds are normal       Palpations: Abdomen is soft  Musculoskeletal: Normal range of motion  Skin:     General: Skin is warm and dry  Neurological:      General: No focal deficit present  Mental Status: He is alert and oriented to person, place, and time  Psychiatric:         Mood and Affect: Mood normal          Behavior: Behavior normal          Thought Content:  Thought content normal          Judgment: Judgment normal            Recent Results (from the past 1008 hour(s))   Lipid panel    Collection Time: 04/08/21  6:51 AM   Result Value Ref Range    Total Cholesterol 224 (H) <200 mg/dL    HDL 45 > OR = 40 mg/dL    Triglycerides 161 (H) <150 mg/dL    LDL Calculated 149 (H) mg/dL (calc) Chol HDLC Ratio 5 0 (H) <5 0 (calc)    Non-HDL Cholesterol 179 (H) <130 mg/dL (calc)   Comprehensive metabolic panel    Collection Time: 04/08/21  6:51 AM   Result Value Ref Range    Glucose, Random 83 65 - 99 mg/dL    BUN 19 7 - 25 mg/dL    Creatinine 0 94 0 70 - 1 25 mg/dL    eGFR Non  86 > OR = 60 mL/min/1 73m2    eGFR  100 > OR = 60 mL/min/1 73m2    SL AMB BUN/CREATININE RATIO NOT APPLICABLE 6 - 22 (calc)    Sodium 138 135 - 146 mmol/L    Potassium 4 1 3 5 - 5 3 mmol/L    Chloride 104 98 - 110 mmol/L    CO2 28 20 - 32 mmol/L    Calcium 9 0 8 6 - 10 3 mg/dL    Protein, Total 6 9 6 1 - 8 1 g/dL    Albumin 4 2 3 6 - 5 1 g/dL    Globulin 2 7 1 9 - 3 7 g/dL (calc)    Albumin/Globulin Ratio 1 6 1 0 - 2 5 (calc)    TOTAL BILIRUBIN 1 3 (H) 0 2 - 1 2 mg/dL    Alkaline Phosphatase 46 35 - 144 U/L    AST 16 10 - 35 U/L    ALT 21 9 - 46 U/L   TSH, 3rd generation with Free T4 reflex    Collection Time: 04/08/21  6:51 AM   Result Value Ref Range    TSH W/RFX TO FREE T4 2 94 0 40 - 4 50 mIU/L       Assessment/Plan:    Colon polyps  Last colonoscopy was performed in May of 2016  Would be due this may for repeat colonoscopy  Reach out to gastroenterologist for the patient to be scheduled    Other specified hypothyroidism  Well controlled on current dose of levothyroxine  Continue same  Repeat TSH in 6 months    Borderline hyperlipidemia  Patient needs to watch dietary intake of fat and cholesterol  Recommend lifestyle modification  Repeat lipid profile to be done in 6 months  If cholesterol continues to increase then the patient will need medication but hopefully he can get this back down with diet          Problem List Items Addressed This Visit        Digestive    Colon polyps     Last colonoscopy was performed in May of 2016  Would be due this may for repeat colonoscopy    Reach out to gastroenterologist for the patient to be scheduled            Endocrine    Other specified hypothyroidism - Primary     Well controlled on current dose of levothyroxine  Continue same  Repeat TSH in 6 months         Relevant Orders    CBC and differential    TSH, 3rd generation with Free T4 reflex       Other    Borderline hyperlipidemia     Patient needs to watch dietary intake of fat and cholesterol  Recommend lifestyle modification  Repeat lipid profile to be done in 6 months    If cholesterol continues to increase then the patient will need medication but hopefully he can get this back down with diet         Relevant Orders    CBC and differential    Comprehensive metabolic panel    Lipid panel      Other Visit Diagnoses     Prostate cancer screening        Relevant Orders    PSA, Total Screen

## 2021-04-15 NOTE — ASSESSMENT & PLAN NOTE
Last colonoscopy was performed in May of 2016  Would be due this may for repeat colonoscopy    Reach out to gastroenterologist for the patient to be scheduled

## 2021-07-15 ENCOUNTER — TELEPHONE (OUTPATIENT)
Dept: GASTROENTEROLOGY | Facility: AMBULARY SURGERY CENTER | Age: 61
End: 2021-07-15

## 2021-07-15 NOTE — TELEPHONE ENCOUNTER
LM for pt to return call re: scheduling follow up colonoscopy w/ dr hines/provided direct phone number in scheduling on this message to pt

## 2021-07-19 DIAGNOSIS — E03.9 HYPOTHYROIDISM, UNSPECIFIED TYPE: ICD-10-CM

## 2021-07-19 RX ORDER — LEVOTHYROXINE SODIUM 0.12 MG/1
125 TABLET ORAL DAILY
Qty: 90 TABLET | Refills: 3 | Status: SHIPPED | OUTPATIENT
Start: 2021-07-19 | End: 2022-07-08 | Stop reason: SDUPTHER

## 2021-07-20 ENCOUNTER — TELEPHONE (OUTPATIENT)
Dept: GASTROENTEROLOGY | Facility: AMBULARY SURGERY CENTER | Age: 61
End: 2021-07-20

## 2021-07-20 NOTE — TELEPHONE ENCOUNTER
Patient is scheduled for colonoscopy on August 26 , 2021 at [facility]  with Victorina Jessica MD  Patient is aware of pre-procedure prep of Miralax/Dulcolax and they will be called the day prior between 2 and 6 pm for time to report for procedure   Pt confirmed he received emailed bowel prep instructions

## 2021-08-12 ENCOUNTER — ANESTHESIA EVENT (OUTPATIENT)
Dept: ANESTHESIOLOGY | Facility: HOSPITAL | Age: 61
End: 2021-08-12

## 2021-08-12 ENCOUNTER — ANESTHESIA (OUTPATIENT)
Dept: ANESTHESIOLOGY | Facility: HOSPITAL | Age: 61
End: 2021-08-12

## 2021-08-25 ENCOUNTER — TELEPHONE (OUTPATIENT)
Dept: GASTROENTEROLOGY | Facility: AMBULARY SURGERY CENTER | Age: 61
End: 2021-08-25

## 2021-08-26 ENCOUNTER — HOSPITAL ENCOUNTER (OUTPATIENT)
Dept: GASTROENTEROLOGY | Facility: AMBULARY SURGERY CENTER | Age: 61
Setting detail: OUTPATIENT SURGERY
Discharge: HOME | End: 2021-08-26
Attending: INTERNAL MEDICINE | Admitting: INTERNAL MEDICINE
Payer: COMMERCIAL

## 2021-08-26 ENCOUNTER — ANESTHESIA (OUTPATIENT)
Dept: GASTROENTEROLOGY | Facility: AMBULARY SURGERY CENTER | Age: 61
End: 2021-08-26

## 2021-08-26 ENCOUNTER — ANESTHESIA EVENT (OUTPATIENT)
Dept: GASTROENTEROLOGY | Facility: AMBULARY SURGERY CENTER | Age: 61
End: 2021-08-26

## 2021-08-26 VITALS
WEIGHT: 191 LBS | SYSTOLIC BLOOD PRESSURE: 104 MMHG | TEMPERATURE: 97.5 F | RESPIRATION RATE: 20 BRPM | OXYGEN SATURATION: 97 % | DIASTOLIC BLOOD PRESSURE: 68 MMHG | HEART RATE: 63 BPM | BODY MASS INDEX: 25.31 KG/M2 | HEIGHT: 73 IN

## 2021-08-26 DIAGNOSIS — K63.5 POLYP OF COLON, UNSPECIFIED PART OF COLON, UNSPECIFIED TYPE: ICD-10-CM

## 2021-08-26 PROCEDURE — G0105 COLORECTAL SCRN; HI RISK IND: HCPCS | Performed by: INTERNAL MEDICINE

## 2021-08-26 RX ORDER — PROPOFOL 10 MG/ML
INJECTION, EMULSION INTRAVENOUS AS NEEDED
Status: DISCONTINUED | OUTPATIENT
Start: 2021-08-26 | End: 2021-08-26

## 2021-08-26 RX ORDER — LIDOCAINE HYDROCHLORIDE 10 MG/ML
INJECTION, SOLUTION EPIDURAL; INFILTRATION; INTRACAUDAL; PERINEURAL AS NEEDED
Status: DISCONTINUED | OUTPATIENT
Start: 2021-08-26 | End: 2021-08-26

## 2021-08-26 RX ORDER — SODIUM CHLORIDE, SODIUM LACTATE, POTASSIUM CHLORIDE, CALCIUM CHLORIDE 600; 310; 30; 20 MG/100ML; MG/100ML; MG/100ML; MG/100ML
125 INJECTION, SOLUTION INTRAVENOUS CONTINUOUS
Status: DISCONTINUED | OUTPATIENT
Start: 2021-08-26 | End: 2021-08-30 | Stop reason: HOSPADM

## 2021-08-26 RX ADMIN — SODIUM CHLORIDE, SODIUM LACTATE, POTASSIUM CHLORIDE, AND CALCIUM CHLORIDE: .6; .31; .03; .02 INJECTION, SOLUTION INTRAVENOUS at 09:30

## 2021-08-26 RX ADMIN — PROPOFOL 50 MG: 10 INJECTION, EMULSION INTRAVENOUS at 10:26

## 2021-08-26 RX ADMIN — LIDOCAINE HYDROCHLORIDE 50 MG: 10 INJECTION, SOLUTION EPIDURAL; INFILTRATION; INTRACAUDAL at 10:20

## 2021-08-26 RX ADMIN — PROPOFOL 20 MG: 10 INJECTION, EMULSION INTRAVENOUS at 10:28

## 2021-08-26 RX ADMIN — PROPOFOL 100 MG: 10 INJECTION, EMULSION INTRAVENOUS at 10:20

## 2021-08-26 RX ADMIN — PROPOFOL 50 MG: 10 INJECTION, EMULSION INTRAVENOUS at 10:23

## 2021-08-26 NOTE — DISCHARGE INSTRUCTIONS
Diverticulosis   WHAT YOU NEED TO KNOW:   Diverticulosis is a condition that causes small pockets called diverticula to form in your intestine  These pockets make it difficult for bowel movements to pass through your digestive system  DISCHARGE INSTRUCTIONS:   Seek care immediately if:   · You have severe pain on the left side of your lower abdomen  · Your bowel movements are bright or dark red  Contact your healthcare provider if:   · You have a fever and chills  · You feel dizzy or lightheaded  · You have nausea, or you are vomiting  · You have a change in your bowel movements  · You have questions or concerns about your condition or care  Medicines:   · Medicines  to soften your bowel movements may be given  You may also need medicines to treat symptoms such as bloating and pain  · Take your medicine as directed  Contact your healthcare provider if you think your medicine is not helping or if you have side effects  Tell him or her if you are allergic to any medicine  Keep a list of the medicines, vitamins, and herbs you take  Include the amounts, and when and why you take them  Bring the list or the pill bottles to follow-up visits  Carry your medicine list with you in case of an emergency  Self-care: The goal of treatment is to manage any symptoms you have and prevent other problems such as diverticulitis  Diverticulitis is swelling or infection of the diverticula  Your healthcare provider may recommend any of the following:  · Eat a variety of high-fiber foods  High-fiber foods help you have regular bowel movements  High-fiber foods include cooked beans, fruits, vegetables, and some cereals  Most adults need 25 to 35 grams of fiber each day  Your healthcare provider may recommend that you have more  Ask your healthcare provider how much fiber you need  Increase fiber slowly   You may have abdominal discomfort, bloating, and gas if you add fiber to your diet too quickly  You may need to take a fiber supplement if you are not getting enough fiber from food  · Drink liquids as directed  You may need to drink 2 to 3 liters (8 to 12 cups) of liquids every day  Ask your healthcare provider how much liquid to drink each day and which liquids are best for you  · Apply heat  on your abdomen for 20 to 30 minutes every 2 hours for as many days as directed  Heat helps decrease pain and muscle spasms  Help prevent diverticulitis or other symptoms: The following may help decrease your risk for diverticulitis or symptoms, such as bleeding  Talk to your provider about these or other things you can do to prevent problems that may occur with diverticulosis  · Exercise regularly  Ask your healthcare provider about the best exercise plan for you  Exercise can help you have regular bowel movements  Get 30 minutes of exercise on most days of the week  · Maintain a healthy weight  Ask your healthcare provider how much you should weigh  Ask him or her to help you create a weight loss plan if you are overweight  · Do not smoke  Nicotine and other chemicals in cigarettes increase your risk for diverticulitis  Ask your healthcare provider for information if you currently smoke and need help to quit  E-cigarettes or smokeless tobacco still contain nicotine  Talk to your healthcare provider before you use these products  · Ask your healthcare provider if it is safe to take NSAIDs  NSAIDs may increase your risk of diverticulitis  Follow up with your healthcare provider as directed:  Write down your questions so you remember to ask them during your visits  © Copyright [employer] 2021 Information is for End User's use only and may not be sold, redistributed or otherwise used for commercial purposes   All illustrations and images included in CareNotes® are the copyrighted property of [employer]  or Karolina Damon  The above information is an  only  It is not intended as medical advice for individual conditions or treatments  Talk to your doctor, nurse or pharmacist before following any medical regimen to see if it is safe and effective for you  Diverticulosis Diet   WHAT YOU NEED TO KNOW:   What is a diverticulosis diet? A diverticulosis diet includes high-fiber foods  High-fiber foods help you have regular bowel movements  Extra fiber may decrease your risk of forming new diverticula (small pockets) in your intestine  A high-fiber diet may also help prevent diverticulitis  Diverticulitis is a painful condition that occurs when diverticula become inflamed or infected  You do not need to avoid nuts, seeds, corn, or popcorn while you are on a diverticulosis diet  How much fiber do I need? You may need 25 to 35 grams of fiber each day  Ask your dietitian or healthcare provider how much fiber you should have  Increase your intake of fiber slowly  When you eat more fiber, you may have gas and feel bloated  You may need to take a fiber supplement if you do not get enough fiber from food  Drink plenty of liquids as you increase the fiber in your diet  Your dietitian or healthcare provider may recommend 8 eight-ounce cups or more each day  Ask which liquids are best for you  Which foods are high in fiber? · Foods with at least 4 grams of fiber per serving:      ? ? to ½ cup of high-fiber cereal (check the nutrition label on the box)    ? ½ cup of blackberries or raspberries    ? 4 dried prunes    ? 1 cooked artichoke    ? ½ cup of cooked legumes, such as lentils, or red, kidney, and hunter beans    · Foods with 1 to 3 grams of fiber per serving:      ? 1 slice of whole-wheat, pumpernickel, or rye bread    ? 4 whole-wheat crackers    ? ½ cup of cereal with 1 to 3 grams of fiber per serving (check the nutrition label on the box)    ? 1 piece of fruit, such as an apple, banana, pear, kiwi, or orange    ? 3 dates    ?  ½ cup of canned apricots, fruit cocktail, peaches, or pears    ? ½ cup of raw or cooked vegetables, such as carrots, cauliflower, cabbage, spinach, squash, or corn    When should I contact my healthcare provider? · You have questions about a high-fiber diet  · You have a change in your bowel movements  · You have an upset stomach  · You have a fever  · You have pain in your lower abdomen on the left side  · You have questions about your condition or care  CARE AGREEMENT:   You have the right to help plan your care  Learn about your health condition and how it may be treated  Discuss treatment options with your healthcare providers to decide what care you want to receive  You always have the right to refuse treatment  The above information is an  only  It is not intended as medical advice for individual conditions or treatments  Talk to your doctor, nurse or pharmacist before following any medical regimen to see if it is safe and effective for you  © Copyright [employer] 2021 Information is for End User's use only and may not be sold, redistributed or otherwise used for commercial purposes  All illustrations and images included in CareNotes® are the copyrighted property of [employer]  or [address] [place] [address]      Colonoscopy   WHAT YOU NEED TO KNOW:   A colonoscopy is a procedure to examine the inside of your colon (intestine) with a scope  Polyps or tissue growths may have been removed during your colonoscopy  It is normal to feel bloated and to have some abdominal discomfort  You should be passing gas  If you have hemorrhoids or you had polyps removed, you may have a small amount of bleeding  DISCHARGE INSTRUCTIONS:   Seek care immediately if:   · You have a large amount of bright red blood in your bowel movements  · Your abdomen is hard and firm and you have severe pain  · You have sudden trouble breathing  Contact your healthcare provider if:   · You develop a rash or hives      · You have a fever within 24 hours of your procedure       · You have not had a bowel movement for 3 days after your procedure  · You have questions or concerns about your condition or care  Activity:   · Do not lift, strain, or run  for 3 days after your procedure  · Rest after your procedure  You have been given medicine to relax you  Do not  drive or make important decisions until the day after your procedure  Return to your normal activity as directed  · Relieve gas and discomfort from bloating  by lying on your right side with a heating pad on your abdomen  You may need to take short walks to help the gas move out  Eat small meals until bloating is relieved  If you had polyps removed: For 7 days after your procedure:  · Do not  take aspirin  · Do not  go on long car rides  Follow up with your healthcare provider as directed:  Write down your questions so you remember to ask them during your visits  © 2017 [address] is for End User's use only and may not be sold, redistributed or otherwise used for commercial purposes  All illustrations and images included in CareNotes® are the copyrighted property of [employer] Inc  or Chacho Franks  The above information is an  only  It is not intended as medical advice for individual conditions or treatments  Talk to your doctor, nurse or pharmacist before following any medical regimen to see if it is safe and effective for you

## 2021-08-26 NOTE — ANESTHESIA POSTPROCEDURE EVALUATION
Post-Op Assessment Note    CV Status:  Stable    Pain management: adequate     Mental Status:  Alert and awake   Hydration Status:  Euvolemic   PONV Controlled:  Controlled   Airway Patency:  Patent      Post Op Vitals Reviewed: Yes      Staff: CRNA         No complications documented      BP  123/74   Temp   97 5   Pulse  79   Resp   16   SpO2   99%

## 2021-08-26 NOTE — H&P
History and Physical - [facility]  Arturonoreen Miles Brandt 59 y o  male MRN: [id number]        HPI:  77-year-old male with history of hypothyroidism and colon polyps  Regular bowel movements  Historical Information   Past Medical History:   Diagnosis Date    Disease of thyroid gland      Past Surgical History:   Procedure Laterality Date    [place] COLONOSCOPY FLX DX W/COLLJ SPEC WHEN PFRMD N/A 5/2/2016    Procedure: COLONOSCOPY;  Surgeon: Hector Serrano MD;  Location: [facility]; Service: Gastroenterology    TONSILLECTOMY       Social History   Social History     Substance and Sexual Activity   Alcohol Use Yes    Comment: social     Social History     Substance and Sexual Activity   Drug Use No     Social History     Tobacco Use   Smoking Status Former Smoker   Smokeless Tobacco Never Used     Family History   Problem Relation Age of Onset    Hypertension Mother     Colon cancer Mother     Colon cancer Father         ascending    Heart attack Father         MI       Meds/Allergies     (Not in a hospital admission)      No Known Allergies    Objective     Blood pressure 136/76, pulse 66, temperature 97 8 °F (36 6 °C), temperature source Temporal, resp  rate 16, height 6' 1" (1 854 m), weight 86 6 kg (191 lb), SpO2 100 %      PHYSICAL EXAM:    Gen: NAD  CV: S1 & S2 normal, RRR  CHEST: Clear to auscultate  ABD: soft, NT/ND, good bowel sounds  EXT: no edema    ASSESSMENT:     History of colon polyps    PLAN:    Colonoscopy

## 2021-08-26 NOTE — ANESTHESIA PREPROCEDURE EVALUATION
Procedure:  COLONOSCOPY    Relevant Problems   CARDIO   (+) Borderline hyperlipidemia      ENDO   (+) Other specified hypothyroidism        Physical Exam    Airway    Mallampati score: I  TM Distance: >3 FB  Neck ROM: full     Dental   No notable dental hx     Cardiovascular      Pulmonary      Other Findings        Anesthesia Plan  ASA Score- 2     Anesthesia Type- IV sedation with anesthesia with ASA Monitors  Additional Monitors:   Airway Plan:           Plan Factors-Exercise tolerance (METS): >4 METS  Chart reviewed  Patient summary reviewed  Patient is not a current smoker  Induction- intravenous  Postoperative Plan-     Informed Consent- Anesthetic plan and risks discussed with patient  I personally reviewed this patient with the CRNA  Discussed and agreed on the Anesthesia Plan with the CRNA  Manuel Pena

## 2021-10-21 LAB
ALBUMIN SERPL-MCNC: 4.2 G/DL (ref 3.6–5.1)
ALBUMIN/GLOB SERPL: 1.6 (CALC) (ref 1–2.5)
ALP SERPL-CCNC: 45 U/L (ref 35–144)
ALT SERPL-CCNC: 15 U/L (ref 9–46)
AST SERPL-CCNC: 15 U/L (ref 10–35)
BASOPHILS # BLD AUTO: 41 CELLS/UL (ref 0–200)
BASOPHILS NFR BLD AUTO: 0.7 %
BILIRUB SERPL-MCNC: 1.7 MG/DL (ref 0.2–1.2)
BUN SERPL-MCNC: 11 MG/DL (ref 7–25)
BUN/CREAT SERPL: ABNORMAL (CALC) (ref 6–22)
CALCIUM SERPL-MCNC: 9.4 MG/DL (ref 8.6–10.3)
CHLORIDE SERPL-SCNC: 104 MMOL/L (ref 98–110)
CHOLEST SERPL-MCNC: 223 MG/DL
CHOLEST/HDLC SERPL: 4.5 (CALC)
CO2 SERPL-SCNC: 29 MMOL/L (ref 20–32)
CREAT SERPL-MCNC: 1.03 MG/DL (ref 0.7–1.25)
EOSINOPHIL # BLD AUTO: 301 CELLS/UL (ref 15–500)
EOSINOPHIL NFR BLD AUTO: 5.1 %
ERYTHROCYTE [DISTWIDTH] IN BLOOD BY AUTOMATED COUNT: 13.2 % (ref 11–15)
GLOBULIN SER CALC-MCNC: 2.7 G/DL (CALC) (ref 1.9–3.7)
GLUCOSE SERPL-MCNC: 89 MG/DL (ref 65–99)
HCT VFR BLD AUTO: 47 % (ref 38.5–50)
HDLC SERPL-MCNC: 50 MG/DL
HGB BLD-MCNC: 16 G/DL (ref 13.2–17.1)
LDLC SERPL CALC-MCNC: 142 MG/DL (CALC)
LYMPHOCYTES # BLD AUTO: 2248 CELLS/UL (ref 850–3900)
LYMPHOCYTES NFR BLD AUTO: 38.1 %
MCH RBC QN AUTO: 31.1 PG (ref 27–33)
MCHC RBC AUTO-ENTMCNC: 34 G/DL (ref 32–36)
MCV RBC AUTO: 91.3 FL (ref 80–100)
MONOCYTES # BLD AUTO: 643 CELLS/UL (ref 200–950)
MONOCYTES NFR BLD AUTO: 10.9 %
NEUTROPHILS # BLD AUTO: 2667 CELLS/UL (ref 1500–7800)
NEUTROPHILS NFR BLD AUTO: 45.2 %
NONHDLC SERPL-MCNC: 173 MG/DL (CALC)
PLATELET # BLD AUTO: 246 THOUSAND/UL (ref 140–400)
PMV BLD REES-ECKER: 10 FL (ref 7.5–12.5)
POTASSIUM SERPL-SCNC: 4.6 MMOL/L (ref 3.5–5.3)
PROT SERPL-MCNC: 6.9 G/DL (ref 6.1–8.1)
PSA SERPL-MCNC: 1.36 NG/ML
RBC # BLD AUTO: 5.15 MILLION/UL (ref 4.2–5.8)
SL AMB EGFR AFRICAN AMERICAN: 89 ML/MIN/1.73M2
SL AMB EGFR NON AFRICAN AMERICAN: 77 ML/MIN/1.73M2
SODIUM SERPL-SCNC: 139 MMOL/L (ref 135–146)
TRIGL SERPL-MCNC: 172 MG/DL
TSH SERPL-ACNC: 3.81 MIU/L (ref 0.4–4.5)
WBC # BLD AUTO: 5.9 THOUSAND/UL (ref 3.8–10.8)

## 2021-10-29 ENCOUNTER — OFFICE VISIT (OUTPATIENT)
Dept: FAMILY MEDICINE CLINIC | Facility: CLINIC | Age: 61
End: 2021-10-29
Payer: COMMERCIAL

## 2021-10-29 VITALS
SYSTOLIC BLOOD PRESSURE: 118 MMHG | HEIGHT: 73 IN | RESPIRATION RATE: 16 BRPM | HEART RATE: 71 BPM | WEIGHT: 198 LBS | OXYGEN SATURATION: 98 % | DIASTOLIC BLOOD PRESSURE: 66 MMHG | BODY MASS INDEX: 26.24 KG/M2

## 2021-10-29 DIAGNOSIS — E03.8 OTHER SPECIFIED HYPOTHYROIDISM: ICD-10-CM

## 2021-10-29 DIAGNOSIS — Z00.00 ENCOUNTER FOR ANNUAL PHYSICAL EXAM: Primary | ICD-10-CM

## 2021-10-29 DIAGNOSIS — L57.8 ACTINIC SKIN DAMAGE: ICD-10-CM

## 2021-10-29 DIAGNOSIS — E78.5 BORDERLINE HYPERLIPIDEMIA: ICD-10-CM

## 2021-10-29 DIAGNOSIS — Z23 NEED FOR PROPHYLACTIC VACCINATION AGAINST DIPHTHERIA AND TETANUS: ICD-10-CM

## 2021-10-29 PROCEDURE — 99396 PREV VISIT EST AGE 40-64: CPT | Performed by: FAMILY MEDICINE

## 2021-10-29 PROCEDURE — 90715 TDAP VACCINE 7 YRS/> IM: CPT | Performed by: FAMILY MEDICINE

## 2021-10-29 PROCEDURE — 90471 IMMUNIZATION ADMIN: CPT | Performed by: FAMILY MEDICINE

## 2021-10-29 PROCEDURE — 17000 DESTRUCT PREMALG LESION: CPT | Performed by: FAMILY MEDICINE

## 2021-10-29 PROCEDURE — 99213 OFFICE O/P EST LOW 20 MIN: CPT | Performed by: FAMILY MEDICINE

## 2022-06-20 LAB
ALBUMIN SERPL-MCNC: 4.4 G/DL (ref 3.6–5.1)
ALBUMIN/GLOB SERPL: 1.8 (CALC) (ref 1–2.5)
ALP SERPL-CCNC: 43 U/L (ref 35–144)
ALT SERPL-CCNC: 18 U/L (ref 9–46)
AST SERPL-CCNC: 18 U/L (ref 10–35)
BILIRUB SERPL-MCNC: 1.5 MG/DL (ref 0.2–1.2)
BUN SERPL-MCNC: 11 MG/DL (ref 7–25)
BUN/CREAT SERPL: ABNORMAL (CALC) (ref 6–22)
CALCIUM SERPL-MCNC: 9.2 MG/DL (ref 8.6–10.3)
CHLORIDE SERPL-SCNC: 105 MMOL/L (ref 98–110)
CHOLEST SERPL-MCNC: 214 MG/DL
CHOLEST/HDLC SERPL: 4 (CALC)
CO2 SERPL-SCNC: 28 MMOL/L (ref 20–32)
CREAT SERPL-MCNC: 0.91 MG/DL (ref 0.7–1.25)
GLOBULIN SER CALC-MCNC: 2.5 G/DL (CALC) (ref 1.9–3.7)
GLUCOSE SERPL-MCNC: 94 MG/DL (ref 65–99)
HDLC SERPL-MCNC: 53 MG/DL
LDLC SERPL CALC-MCNC: 136 MG/DL (CALC)
NONHDLC SERPL-MCNC: 161 MG/DL (CALC)
POTASSIUM SERPL-SCNC: 4.4 MMOL/L (ref 3.5–5.3)
PROT SERPL-MCNC: 6.9 G/DL (ref 6.1–8.1)
SL AMB EGFR AFRICAN AMERICAN: 103 ML/MIN/1.73M2
SL AMB EGFR NON AFRICAN AMERICAN: 89 ML/MIN/1.73M2
SODIUM SERPL-SCNC: 140 MMOL/L (ref 135–146)
TRIGL SERPL-MCNC: 125 MG/DL
TSH SERPL-ACNC: 3.78 MIU/L (ref 0.4–4.5)

## 2022-07-08 ENCOUNTER — OFFICE VISIT (OUTPATIENT)
Dept: FAMILY MEDICINE CLINIC | Facility: CLINIC | Age: 62
End: 2022-07-08
Payer: COMMERCIAL

## 2022-07-08 VITALS
SYSTOLIC BLOOD PRESSURE: 126 MMHG | RESPIRATION RATE: 16 BRPM | OXYGEN SATURATION: 99 % | BODY MASS INDEX: 26.24 KG/M2 | DIASTOLIC BLOOD PRESSURE: 78 MMHG | HEIGHT: 73 IN | HEART RATE: 80 BPM | WEIGHT: 198 LBS

## 2022-07-08 DIAGNOSIS — E03.9 HYPOTHYROIDISM, UNSPECIFIED TYPE: ICD-10-CM

## 2022-07-08 DIAGNOSIS — Z00.00 ENCOUNTER FOR ANNUAL PHYSICAL EXAM: Primary | ICD-10-CM

## 2022-07-08 DIAGNOSIS — Z12.5 PROSTATE CANCER SCREENING: ICD-10-CM

## 2022-07-08 DIAGNOSIS — E03.8 OTHER SPECIFIED HYPOTHYROIDISM: ICD-10-CM

## 2022-07-08 DIAGNOSIS — E78.5 BORDERLINE HYPERLIPIDEMIA: ICD-10-CM

## 2022-07-08 PROCEDURE — 99396 PREV VISIT EST AGE 40-64: CPT | Performed by: FAMILY MEDICINE

## 2022-07-08 PROCEDURE — 3725F SCREEN DEPRESSION PERFORMED: CPT | Performed by: FAMILY MEDICINE

## 2022-07-08 RX ORDER — LEVOTHYROXINE SODIUM 0.12 MG/1
125 TABLET ORAL DAILY
Qty: 90 TABLET | Refills: 3 | Status: SHIPPED | OUTPATIENT
Start: 2022-07-08

## 2022-07-08 NOTE — PROGRESS NOTES
Subjective:      Patient ID: Alesha Rich is a 57 y o  male  58-year-old male with past medical history of hypothyroidism and borderline hyperlipidemia presents for annual physical examination and follow-up of chronic conditions  No particular complaints or concerns  Some was diagnosed with brain tumor in January and he is doing well  Recently had twin boys grandchildren  Labs reviewed which showed normalized TSH and significant improvement in his cholesterol      Past Medical History:   Diagnosis Date    Disease of thyroid gland        Family History   Problem Relation Age of Onset    Hypertension Mother     Colon cancer Mother     Colon cancer Father         ascending    Heart attack Father         MI       Past Surgical History:   Procedure Laterality Date    [place] COLONOSCOPY FLX DX W/COLLJ SPEC WHEN PFRMD N/A 5/2/2016    Procedure: COLONOSCOPY;  Surgeon: Meliton Conner MD;  Location: [facility]; Service: Gastroenterology    TONSILLECTOMY          reports that he has quit smoking  He has never used smokeless tobacco  He reports current alcohol use  He reports that he does not use drugs  Current Outpatient Medications:     levothyroxine 125 mcg tablet, Take 1 tablet (125 mcg total) by mouth daily, Disp: 90 tablet, Rfl: 3    The following portions of the patient's history were reviewed and updated as appropriate: allergies, current medications, past family history, past medical history, past social history, past surgical history and problem list     Review of Systems   Constitutional: Negative  HENT: Negative  Eyes: Negative  Respiratory: Negative  Cardiovascular: Negative  Gastrointestinal: Negative  Endocrine: Negative  Genitourinary: Negative  Musculoskeletal: Negative  Skin: Negative  Allergic/Immunologic: Negative  Neurological: Negative  Hematological: Negative  Psychiatric/Behavioral: Negative  All other systems reviewed and are negative  Objective:    /78   Pulse 80   Resp 16   Ht 6' 1" (1 854 m)   Wt 89 8 kg (198 lb)   SpO2 99%   BMI 26 12 kg/m²      Physical Exam  Vitals and nursing note reviewed  Constitutional:       General: He is not in acute distress  Appearance: Normal appearance  He is well-developed and normal weight  He is not ill-appearing  HENT:      Head: Normocephalic and atraumatic  Right Ear: Tympanic membrane, ear canal and external ear normal       Left Ear: Tympanic membrane, ear canal and external ear normal       Nose: Nose normal       Mouth/Throat:      Mouth: Mucous membranes are moist    Eyes:      Extraocular Movements: Extraocular movements intact  Conjunctiva/sclera: Conjunctivae normal       Pupils: Pupils are equal, round, and reactive to light  Cardiovascular:      Rate and Rhythm: Normal rate and regular rhythm  Pulses: Normal pulses  Heart sounds: Normal heart sounds  No murmur heard  Pulmonary:      Effort: Pulmonary effort is normal       Breath sounds: Normal breath sounds  Abdominal:      General: Abdomen is flat  Bowel sounds are normal       Palpations: Abdomen is soft  Musculoskeletal:         General: Normal range of motion  Cervical back: Normal range of motion and neck supple  Skin:     General: Skin is warm and dry  Neurological:      General: No focal deficit present  Mental Status: He is alert and oriented to person, place, and time  Psychiatric:         Mood and Affect: Mood normal          Behavior: Behavior normal          Thought Content:  Thought content normal          Judgment: Judgment normal            Recent Results (from the past 1008 hour(s))   Lipid panel    Collection Time: 06/20/22  7:06 AM   Result Value Ref Range    Total Cholesterol 214 (H) <200 mg/dL    HDL 53 > OR = 40 mg/dL    Triglycerides 125 <150 mg/dL    LDL Calculated 136 (H) mg/dL (calc)    Chol HDLC Ratio 4 0 <5 0 (calc)    Non-HDL Cholesterol 161 (H) <130 mg/dL (calc)   Comprehensive metabolic panel    Collection Time: 06/20/22  7:06 AM   Result Value Ref Range    Glucose, Random 94 65 - 99 mg/dL    BUN 11 7 - 25 mg/dL    Creatinine 0 91 0 70 - 1 25 mg/dL    eGFR Non  89 > OR = 60 mL/min/1 73m2    eGFR  103 > OR = 60 mL/min/1 73m2    SL AMB BUN/CREATININE RATIO NOT APPLICABLE 6 - 22 (calc)    Sodium 140 135 - 146 mmol/L    Potassium 4 4 3 5 - 5 3 mmol/L    Chloride 105 98 - 110 mmol/L    CO2 28 20 - 32 mmol/L    Calcium 9 2 8 6 - 10 3 mg/dL    Protein, Total 6 9 6 1 - 8 1 g/dL    Albumin 4 4 3 6 - 5 1 g/dL    Globulin 2 5 1 9 - 3 7 g/dL (calc)    Albumin/Globulin Ratio 1 8 1 0 - 2 5 (calc)    TOTAL BILIRUBIN 1 5 (H) 0 2 - 1 2 mg/dL    Alkaline Phosphatase 43 35 - 144 U/L    AST 18 10 - 35 U/L    ALT 18 9 - 46 U/L   TSH, 3rd generation with Free T4 reflex    Collection Time: 06/20/22  7:06 AM   Result Value Ref Range    TSH W/RFX TO FREE T4 3 78 0 40 - 4 50 mIU/L       Assessment/Plan:    Hypothyroidism  Well controlled on current dose of levothyroxine  Continue same  Refill provided  Recheck thyroid function in 6 months    Prostate cancer screening  Screening PSA with next set of labs    Encounter for annual physical exam  Annual physical examination completed    Borderline hyperlipidemia  Continue to watch dietary intake of fats and cholesterol  Continue taking red rice yeast   Repeat lipid panel in 6 months          Problem List Items Addressed This Visit        Endocrine    Hypothyroidism     Well controlled on current dose of levothyroxine  Continue same  Refill provided  Recheck thyroid function in 6 months           Relevant Medications    levothyroxine 125 mcg tablet    Other Relevant Orders    TSH, 3rd generation with Free T4 reflex       Other    Borderline hyperlipidemia     Continue to watch dietary intake of fats and cholesterol    Continue taking red rice yeast   Repeat lipid panel in 6 months           Relevant Orders    Comprehensive metabolic panel    Lipid panel    Encounter for annual physical exam - Primary     Annual physical examination completed           Relevant Orders    CBC and differential    Prostate cancer screening     Screening PSA with next set of labs           Relevant Orders    PSA, Total Screen

## 2022-07-08 NOTE — ASSESSMENT & PLAN NOTE
Continue to watch dietary intake of fats and cholesterol    Continue taking red rice yeast   Repeat lipid panel in 6 months

## 2022-07-08 NOTE — ASSESSMENT & PLAN NOTE
Well controlled on current dose of levothyroxine  Continue same  Refill provided    Recheck thyroid function in 6 months

## 2022-10-11 PROBLEM — Z12.5 PROSTATE CANCER SCREENING: Status: RESOLVED | Noted: 2022-07-08 | Resolved: 2022-10-11

## 2023-01-13 LAB
ALBUMIN SERPL-MCNC: 4.5 G/DL (ref 3.6–5.1)
ALBUMIN/GLOB SERPL: 1.8 (CALC) (ref 1–2.5)
ALP SERPL-CCNC: 44 U/L (ref 35–144)
ALT SERPL-CCNC: 23 U/L (ref 9–46)
AST SERPL-CCNC: 19 U/L (ref 10–35)
BASOPHILS # BLD AUTO: 40 CELLS/UL (ref 0–200)
BASOPHILS NFR BLD AUTO: 0.8 %
BILIRUB SERPL-MCNC: 1.6 MG/DL (ref 0.2–1.2)
BUN SERPL-MCNC: 13 MG/DL (ref 7–25)
BUN/CREAT SERPL: ABNORMAL (CALC) (ref 6–22)
CALCIUM SERPL-MCNC: 9.3 MG/DL (ref 8.6–10.3)
CHLORIDE SERPL-SCNC: 105 MMOL/L (ref 98–110)
CHOLEST SERPL-MCNC: 233 MG/DL
CHOLEST/HDLC SERPL: 4.6 (CALC)
CO2 SERPL-SCNC: 30 MMOL/L (ref 20–32)
CREAT SERPL-MCNC: 0.93 MG/DL (ref 0.7–1.35)
EOSINOPHIL # BLD AUTO: 220 CELLS/UL (ref 15–500)
EOSINOPHIL NFR BLD AUTO: 4.4 %
ERYTHROCYTE [DISTWIDTH] IN BLOOD BY AUTOMATED COUNT: 12.7 % (ref 11–15)
GFR/BSA.PRED SERPLBLD CYS-BASED-ARV: 92 ML/MIN/1.73M2
GLOBULIN SER CALC-MCNC: 2.5 G/DL (CALC) (ref 1.9–3.7)
GLUCOSE SERPL-MCNC: 86 MG/DL (ref 65–99)
HCT VFR BLD AUTO: 47.2 % (ref 38.5–50)
HDLC SERPL-MCNC: 51 MG/DL
HGB BLD-MCNC: 15.8 G/DL (ref 13.2–17.1)
LDLC SERPL CALC-MCNC: 154 MG/DL (CALC)
LYMPHOCYTES # BLD AUTO: 2010 CELLS/UL (ref 850–3900)
LYMPHOCYTES NFR BLD AUTO: 40.2 %
MCH RBC QN AUTO: 30.2 PG (ref 27–33)
MCHC RBC AUTO-ENTMCNC: 33.5 G/DL (ref 32–36)
MCV RBC AUTO: 90.1 FL (ref 80–100)
MONOCYTES # BLD AUTO: 580 CELLS/UL (ref 200–950)
MONOCYTES NFR BLD AUTO: 11.6 %
NEUTROPHILS # BLD AUTO: 2150 CELLS/UL (ref 1500–7800)
NEUTROPHILS NFR BLD AUTO: 43 %
NONHDLC SERPL-MCNC: 182 MG/DL (CALC)
PLATELET # BLD AUTO: 231 THOUSAND/UL (ref 140–400)
PMV BLD REES-ECKER: 9.7 FL (ref 7.5–12.5)
POTASSIUM SERPL-SCNC: 4.7 MMOL/L (ref 3.5–5.3)
PROT SERPL-MCNC: 7 G/DL (ref 6.1–8.1)
PSA SERPL-MCNC: 1.18 NG/ML
RBC # BLD AUTO: 5.24 MILLION/UL (ref 4.2–5.8)
SODIUM SERPL-SCNC: 139 MMOL/L (ref 135–146)
TRIGL SERPL-MCNC: 153 MG/DL
TSH SERPL-ACNC: 3.57 MIU/L (ref 0.4–4.5)
WBC # BLD AUTO: 5 THOUSAND/UL (ref 3.8–10.8)

## 2023-01-20 ENCOUNTER — OFFICE VISIT (OUTPATIENT)
Dept: FAMILY MEDICINE CLINIC | Facility: CLINIC | Age: 63
End: 2023-01-20

## 2023-01-20 VITALS
RESPIRATION RATE: 16 BRPM | DIASTOLIC BLOOD PRESSURE: 74 MMHG | OXYGEN SATURATION: 98 % | WEIGHT: 192 LBS | SYSTOLIC BLOOD PRESSURE: 122 MMHG | HEIGHT: 73 IN | BODY MASS INDEX: 25.45 KG/M2 | HEART RATE: 84 BPM

## 2023-01-20 DIAGNOSIS — E03.8 OTHER SPECIFIED HYPOTHYROIDISM: ICD-10-CM

## 2023-01-20 DIAGNOSIS — Z00.00 ENCOUNTER FOR ANNUAL PHYSICAL EXAM: Primary | ICD-10-CM

## 2023-01-20 DIAGNOSIS — E78.5 BORDERLINE HYPERLIPIDEMIA: ICD-10-CM

## 2023-01-20 NOTE — ASSESSMENT & PLAN NOTE
Very well controlled on current dose of levothyroxine  Continue same    Recheck thyroid function studies in 6 months

## 2023-01-20 NOTE — ASSESSMENT & PLAN NOTE
Once again really stressed to the patient that he needs to watch his dietary intake of fats and cholesterol  Reduce his intake of saturated fats  Increase his intake of omega-3 fatty acids  Repeat lipid panel in another 6 months  If there is not a substantial improvement then I would recommend statin therapy    Patient is aware and verbalized good understanding

## 2023-01-20 NOTE — PROGRESS NOTES
Subjective:      Patient ID: Lulú Stallings is a 57 y o  male  55-year-old male presents to the office for follow-up of chronic conditions including hypothyroidism and borderline hyperlipidemia  Labs reviewed which showed normal CBC, CMP, very well-controlled TSH at 3 57, normal screening PSA  Patient's cholesterol elevated with total cholesterol 233, HDL 51,  which is the highest his cholesterol has been in years  Patient did see his results and is trying to make some dietary changes and even meeting with a nutritionist to make changes  Patient is once again very reluctant about going on medications  Past Medical History:   Diagnosis Date   • Disease of thyroid gland        Family History   Problem Relation Age of Onset   • Hypertension Mother    • Colon cancer Mother    • Colon cancer Father         ascending   • Heart attack Father         MI       Past Surgical History:   Procedure Laterality Date   • [place] COLONOSCOPY FLX DX W/COLLJ SPEC WHEN PFRMD N/A 5/2/2016    Procedure: COLONOSCOPY;  Surgeon: Crystal Swenson MD;  Location: [facility]; Service: Gastroenterology   • TONSILLECTOMY          reports that he has quit smoking  He has never used smokeless tobacco  He reports current alcohol use  He reports that he does not use drugs  Current Outpatient Medications:   •  levothyroxine 125 mcg tablet, Take 1 tablet (125 mcg total) by mouth daily, Disp: 90 tablet, Rfl: 3    The following portions of the patient's history were reviewed and updated as appropriate: allergies, current medications, past family history, past medical history, past social history, past surgical history and problem list     Review of Systems   Constitutional: Negative  HENT: Negative  Eyes: Negative  Respiratory: Negative  Cardiovascular: Negative  Gastrointestinal: Negative  Endocrine: Negative  Genitourinary: Negative  Musculoskeletal: Negative  Skin: Negative      Allergic/Immunologic: Negative  Neurological: Negative  Hematological: Negative  Psychiatric/Behavioral: Negative  All other systems reviewed and are negative  Objective:    /74   Pulse 84   Resp 16   Ht 6' 1" (1 854 m)   Wt 87 1 kg (192 lb)   SpO2 98%   BMI 25 33 kg/m²      Physical Exam  Vitals and nursing note reviewed  Constitutional:       General: He is not in acute distress  Appearance: Normal appearance  He is well-developed and normal weight  He is not ill-appearing  HENT:      Head: Normocephalic and atraumatic  Right Ear: Tympanic membrane, ear canal and external ear normal       Left Ear: Tympanic membrane, ear canal and external ear normal       Nose: Nose normal       Mouth/Throat:      Mouth: Mucous membranes are moist    Eyes:      Extraocular Movements: Extraocular movements intact  Conjunctiva/sclera: Conjunctivae normal       Pupils: Pupils are equal, round, and reactive to light  Cardiovascular:      Rate and Rhythm: Normal rate and regular rhythm  Pulses: Normal pulses  Heart sounds: Normal heart sounds  No murmur heard  Pulmonary:      Effort: Pulmonary effort is normal       Breath sounds: Normal breath sounds  Abdominal:      General: Abdomen is flat  Bowel sounds are normal       Palpations: Abdomen is soft  Musculoskeletal:         General: Normal range of motion  Cervical back: Normal range of motion and neck supple  Skin:     General: Skin is warm and dry  Neurological:      General: No focal deficit present  Mental Status: He is alert and oriented to person, place, and time  Psychiatric:         Mood and Affect: Mood normal          Behavior: Behavior normal          Thought Content:  Thought content normal          Judgment: Judgment normal            Recent Results (from the past 1008 hour(s))   Lipid panel    Collection Time: 01/13/23  6:53 AM   Result Value Ref Range    Total Cholesterol 233 (H) <200 mg/dL    HDL 51 > OR = 40 mg/dL    Triglycerides 153 (H) <150 mg/dL    LDL Calculated 154 (H) mg/dL (calc)    Chol HDLC Ratio 4 6 <5 0 (calc)    Non-HDL Cholesterol 182 (H) <130 mg/dL (calc)   Comprehensive metabolic panel    Collection Time: 01/13/23  6:53 AM   Result Value Ref Range    Glucose, Random 86 65 - 99 mg/dL    BUN 13 7 - 25 mg/dL    Creatinine 0 93 0 70 - 1 35 mg/dL    eGFR 92 > OR = 60 mL/min/1 73m2    SL AMB BUN/CREATININE RATIO NOT APPLICABLE 6 - 22 (calc)    Sodium 139 135 - 146 mmol/L    Potassium 4 7 3 5 - 5 3 mmol/L    Chloride 105 98 - 110 mmol/L    CO2 30 20 - 32 mmol/L    Calcium 9 3 8 6 - 10 3 mg/dL    Protein, Total 7 0 6 1 - 8 1 g/dL    Albumin 4 5 3 6 - 5 1 g/dL    Globulin 2 5 1 9 - 3 7 g/dL (calc)    Albumin/Globulin Ratio 1 8 1 0 - 2 5 (calc)    TOTAL BILIRUBIN 1 6 (H) 0 2 - 1 2 mg/dL    Alkaline Phosphatase 44 35 - 144 U/L    AST 19 10 - 35 U/L    ALT 23 9 - 46 U/L   CBC and differential    Collection Time: 01/13/23  6:53 AM   Result Value Ref Range    White Blood Cell Count 5 0 3 8 - 10 8 Thousand/uL    Red Blood Cell Count 5 24 4 20 - 5 80 Million/uL    Hemoglobin 15 8 13 2 - 17 1 g/dL    HCT 47 2 38 5 - 50 0 %    MCV 90 1 80 0 - 100 0 fL    MCH 30 2 27 0 - 33 0 pg    MCHC 33 5 32 0 - 36 0 g/dL    RDW 12 7 11 0 - 15 0 %    Platelet Count 095 004 - 400 Thousand/uL    SL AMB MPV 9 7 7 5 - 12 5 fL    Neutrophils (Absolute) 2,150 1,500 - 7,800 cells/uL    Lymphocytes (Absolute) 2,010 850 - 3,900 cells/uL    Monocytes (Absolute) 580 200 - 950 cells/uL    Eosinophils (Absolute) 220 15 - 500 cells/uL    Basophils ABS 40 0 - 200 cells/uL    Neutrophils 43 %    Lymphocytes 40 2 %    Monocytes 11 6 %    Eosinophils 4 4 %    Basophils PCT 0 8 %   PSA, Total Screen    Collection Time: 01/13/23  6:53 AM   Result Value Ref Range    Prostate Specific Antigen Total 1 18 < OR = 4 00 ng/mL   TSH, 3rd generation with Free T4 reflex    Collection Time: 01/13/23  6:53 AM   Result Value Ref Range    TSH W/RFX TO FREE T4 3 57 0 40 - 4 50 mIU/L       Assessment/Plan:    Other specified hypothyroidism  Very well controlled on current dose of levothyroxine  Continue same  Recheck thyroid function studies in 6 months    Borderline hyperlipidemia  Once again really stressed to the patient that he needs to watch his dietary intake of fats and cholesterol  Reduce his intake of saturated fats  Increase his intake of omega-3 fatty acids  Repeat lipid panel in another 6 months  If there is not a substantial improvement then I would recommend statin therapy  Patient is aware and verbalized good understanding          Problem List Items Addressed This Visit        Endocrine    Other specified hypothyroidism     Very well controlled on current dose of levothyroxine  Continue same  Recheck thyroid function studies in 6 months         Relevant Orders    CBC and differential    TSH, 3rd generation with Free T4 reflex       Other    Borderline hyperlipidemia     Once again really stressed to the patient that he needs to watch his dietary intake of fats and cholesterol  Reduce his intake of saturated fats  Increase his intake of omega-3 fatty acids  Repeat lipid panel in another 6 months  If there is not a substantial improvement then I would recommend statin therapy    Patient is aware and verbalized good understanding         Relevant Orders    Comprehensive metabolic panel    Lipid panel    Encounter for annual physical exam - Primary    Relevant Orders    influenza vaccine, quadrivalent, recombinant, PF, 0 5 mL, for patients 18 yr+ ([id number]) (Completed)    CBC and differential

## 2023-07-06 DIAGNOSIS — E03.9 HYPOTHYROIDISM, UNSPECIFIED TYPE: ICD-10-CM

## 2023-07-06 RX ORDER — LEVOTHYROXINE SODIUM 0.12 MG/1
125 TABLET ORAL DAILY
Qty: 90 TABLET | Refills: 1 | Status: SHIPPED | OUTPATIENT
Start: 2023-07-06

## 2023-07-31 LAB
ALBUMIN SERPL-MCNC: 4.3 G/DL (ref 3.6–5.1)
ALBUMIN/GLOB SERPL: 1.5 (CALC) (ref 1–2.5)
ALP SERPL-CCNC: 47 U/L (ref 35–144)
ALT SERPL-CCNC: 14 U/L (ref 9–46)
AST SERPL-CCNC: 14 U/L (ref 10–35)
BASOPHILS # BLD AUTO: 39 CELLS/UL (ref 0–200)
BASOPHILS NFR BLD AUTO: 0.7 %
BILIRUB SERPL-MCNC: 1.2 MG/DL (ref 0.2–1.2)
BUN SERPL-MCNC: 13 MG/DL (ref 7–25)
BUN/CREAT SERPL: NORMAL (CALC) (ref 6–22)
CALCIUM SERPL-MCNC: 9.1 MG/DL (ref 8.6–10.3)
CHLORIDE SERPL-SCNC: 105 MMOL/L (ref 98–110)
CHOLEST SERPL-MCNC: 214 MG/DL
CHOLEST/HDLC SERPL: 4.5 (CALC)
CO2 SERPL-SCNC: 28 MMOL/L (ref 20–32)
CREAT SERPL-MCNC: 0.94 MG/DL (ref 0.7–1.35)
EOSINOPHIL # BLD AUTO: 342 CELLS/UL (ref 15–500)
EOSINOPHIL NFR BLD AUTO: 6.1 %
ERYTHROCYTE [DISTWIDTH] IN BLOOD BY AUTOMATED COUNT: 13 % (ref 11–15)
GFR/BSA.PRED SERPLBLD CYS-BASED-ARV: 90 ML/MIN/1.73M2
GLOBULIN SER CALC-MCNC: 2.8 G/DL (CALC) (ref 1.9–3.7)
GLUCOSE SERPL-MCNC: 93 MG/DL (ref 65–99)
HCT VFR BLD AUTO: 46.5 % (ref 38.5–50)
HDLC SERPL-MCNC: 48 MG/DL
HGB BLD-MCNC: 15.6 G/DL (ref 13.2–17.1)
LDLC SERPL CALC-MCNC: 138 MG/DL (CALC)
LYMPHOCYTES # BLD AUTO: 2173 CELLS/UL (ref 850–3900)
LYMPHOCYTES NFR BLD AUTO: 38.8 %
MCH RBC QN AUTO: 31 PG (ref 27–33)
MCHC RBC AUTO-ENTMCNC: 33.5 G/DL (ref 32–36)
MCV RBC AUTO: 92.3 FL (ref 80–100)
MONOCYTES # BLD AUTO: 622 CELLS/UL (ref 200–950)
MONOCYTES NFR BLD AUTO: 11.1 %
NEUTROPHILS # BLD AUTO: 2425 CELLS/UL (ref 1500–7800)
NEUTROPHILS NFR BLD AUTO: 43.3 %
NONHDLC SERPL-MCNC: 166 MG/DL (CALC)
PLATELET # BLD AUTO: 218 THOUSAND/UL (ref 140–400)
PMV BLD REES-ECKER: 10.1 FL (ref 7.5–12.5)
POTASSIUM SERPL-SCNC: 4 MMOL/L (ref 3.5–5.3)
PROT SERPL-MCNC: 7.1 G/DL (ref 6.1–8.1)
RBC # BLD AUTO: 5.04 MILLION/UL (ref 4.2–5.8)
SODIUM SERPL-SCNC: 140 MMOL/L (ref 135–146)
T4 FREE SERPL-MCNC: 1.4 NG/DL (ref 0.8–1.8)
TRIGL SERPL-MCNC: 153 MG/DL
TSH SERPL-ACNC: 5.35 MIU/L (ref 0.4–4.5)
WBC # BLD AUTO: 5.6 THOUSAND/UL (ref 3.8–10.8)

## 2023-08-04 RX ORDER — AZELAIC ACID 0.15 G/G
GEL TOPICAL
COMMUNITY
Start: 2023-07-05

## 2023-08-04 RX ORDER — MINOCYCLINE HYDROCHLORIDE 100 MG/1
CAPSULE ORAL
COMMUNITY
Start: 2023-07-05

## 2023-08-07 ENCOUNTER — OFFICE VISIT (OUTPATIENT)
Dept: FAMILY MEDICINE CLINIC | Facility: CLINIC | Age: 63
End: 2023-08-07
Payer: COMMERCIAL

## 2023-08-07 VITALS
HEIGHT: 73 IN | OXYGEN SATURATION: 99 % | HEART RATE: 86 BPM | BODY MASS INDEX: 25.84 KG/M2 | SYSTOLIC BLOOD PRESSURE: 132 MMHG | DIASTOLIC BLOOD PRESSURE: 74 MMHG | WEIGHT: 195 LBS | RESPIRATION RATE: 16 BRPM

## 2023-08-07 DIAGNOSIS — Z00.00 ENCOUNTER FOR ANNUAL PHYSICAL EXAM: Primary | ICD-10-CM

## 2023-08-07 DIAGNOSIS — E03.9 HYPOTHYROIDISM, UNSPECIFIED TYPE: ICD-10-CM

## 2023-08-07 DIAGNOSIS — E78.5 BORDERLINE HYPERLIPIDEMIA: ICD-10-CM

## 2023-08-07 DIAGNOSIS — E03.8 OTHER SPECIFIED HYPOTHYROIDISM: ICD-10-CM

## 2023-08-07 PROCEDURE — 99213 OFFICE O/P EST LOW 20 MIN: CPT | Performed by: FAMILY MEDICINE

## 2023-08-07 PROCEDURE — 99397 PER PM REEVAL EST PAT 65+ YR: CPT | Performed by: FAMILY MEDICINE

## 2023-08-07 RX ORDER — LEVOTHYROXINE SODIUM 137 UG/1
137 TABLET ORAL DAILY
Qty: 90 TABLET | Refills: 1 | Status: SHIPPED | OUTPATIENT
Start: 2023-08-07

## 2023-08-07 NOTE — ASSESSMENT & PLAN NOTE
Once again really stressed to the patient that he needs to watch his dietary intake of fats and cholesterol. Reduce his intake of saturated fats. Increase his intake of omega-3 fatty acids. Repeat lipid panel in another 5 months. If there is not a substantial improvement then I would recommend statin therapy.   Patient is aware and verbalized good understanding

## 2023-08-07 NOTE — ASSESSMENT & PLAN NOTE
Increased dose of levothyroxine to 137 mcg daily and repeat thyroid function studies to be done in January

## 2023-08-07 NOTE — PROGRESS NOTES
Subjective:      Patient ID: Neil Najjar is a 70 y.o. male. Generally healthy 57-year-old male presents for annual physical examination and follow-up on conditions including borderline hyperlipidemia, hypothyroidism. Patient has been slightly more stressed as his health is more full than it has been in the past.  Son was diagnosed with brain tumor in 2022 and has moved back in with his parents. His daughter and her 2 sons are living with them and has been for the past 4 years. Son has two 3year-old twins who visit every other weekend. His house is more stressful than he remembers when raising his 4 children. Patient has been trying to make dietary adjustments. Cholesterol has improved. TSH is slightly more elevated at 5.35 with normal free T4 at 1.4      Past Medical History:   Diagnosis Date   • Disease of thyroid gland        Family History   Problem Relation Age of Onset   • Hypertension Mother    • Colon cancer Mother    • Colon cancer Father         ascending   • Heart attack Father         MI       Past Surgical History:   Procedure Laterality Date   • [place] COLONOSCOPY FLX DX W/COLLJ SPEC WHEN PFRMD N/A 5/2/2016    Procedure: COLONOSCOPY;  Surgeon: Lora Marquez MD;  Location: [facility]; Service: Gastroenterology   • TONSILLECTOMY          reports that he has quit smoking. He has never used smokeless tobacco. He reports current alcohol use. He reports that he does not use drugs. Current Outpatient Medications:   •  Azelaic Acid 15 % cream, , Disp: , Rfl:   •  levothyroxine 137 mcg tablet, Take 1 tablet (137 mcg total) by mouth daily, Disp: 90 tablet, Rfl: 1  •  minocycline (MINOCIN) 100 mg capsule, , Disp: , Rfl:     The following portions of the patient's history were reviewed and updated as appropriate: allergies, current medications, past family history, past medical history, past social history, past surgical history and problem list.    Review of Systems   Constitutional: Negative. HENT: Negative. Eyes: Negative. Respiratory: Negative. Cardiovascular: Negative. Gastrointestinal: Negative. Endocrine: Negative. Genitourinary: Negative. Musculoskeletal: Negative. Skin: Negative. Allergic/Immunologic: Negative. Neurological: Negative. Hematological: Negative. Psychiatric/Behavioral: Negative. All other systems reviewed and are negative. Objective:    /74   Pulse 86   Resp 16   Ht 6' 1" (1.854 m)   Wt 88.5 kg (195 lb)   SpO2 99%   BMI 25.73 kg/m²      Physical Exam  Vitals and nursing note reviewed. Constitutional:       General: He is not in acute distress. Appearance: Normal appearance. He is well-developed and normal weight. He is not ill-appearing. HENT:      Head: Normocephalic and atraumatic. Right Ear: Tympanic membrane, ear canal and external ear normal.      Left Ear: Tympanic membrane, ear canal and external ear normal.      Nose: Nose normal.      Mouth/Throat:      Mouth: Mucous membranes are moist.   Eyes:      Extraocular Movements: Extraocular movements intact. Conjunctiva/sclera: Conjunctivae normal.      Pupils: Pupils are equal, round, and reactive to light. Cardiovascular:      Rate and Rhythm: Normal rate and regular rhythm. Pulses: Normal pulses. Heart sounds: Normal heart sounds. No murmur heard. Pulmonary:      Effort: Pulmonary effort is normal.      Breath sounds: Normal breath sounds. Abdominal:      General: Abdomen is flat. Bowel sounds are normal.      Palpations: Abdomen is soft. Musculoskeletal:         General: Normal range of motion. Cervical back: Normal range of motion and neck supple. Skin:     General: Skin is warm and dry. Neurological:      General: No focal deficit present. Mental Status: He is alert and oriented to person, place, and time.    Psychiatric:         Mood and Affect: Mood normal.         Behavior: Behavior normal.         Thought Content:  Thought content normal.         Judgment: Judgment normal.           Recent Results (from the past 1008 hour(s))   Lipid panel    Collection Time: 07/31/23  7:00 AM   Result Value Ref Range    Total Cholesterol 214 (H) <200 mg/dL    HDL 48 > OR = 40 mg/dL    Triglycerides 153 (H) <150 mg/dL    LDL Calculated 138 (H) mg/dL (calc)    Chol HDLC Ratio 4.5 <5.0 (calc)    Non-HDL Cholesterol 166 (H) <130 mg/dL (calc)   Comprehensive metabolic panel    Collection Time: 07/31/23  7:00 AM   Result Value Ref Range    Glucose, Random 93 65 - 99 mg/dL    BUN 13 7 - 25 mg/dL    Creatinine 0.94 0.70 - 1.35 mg/dL    eGFR 90 > OR = 60 mL/min/1.73m2    SL AMB BUN/CREATININE RATIO SEE NOTE: 6 - 22 (calc)    Sodium 140 135 - 146 mmol/L    Potassium 4.0 3.5 - 5.3 mmol/L    Chloride 105 98 - 110 mmol/L    CO2 28 20 - 32 mmol/L    Calcium 9.1 8.6 - 10.3 mg/dL    Protein, Total 7.1 6.1 - 8.1 g/dL    Albumin 4.3 3.6 - 5.1 g/dL    Globulin 2.8 1.9 - 3.7 g/dL (calc)    Albumin/Globulin Ratio 1.5 1.0 - 2.5 (calc)    TOTAL BILIRUBIN 1.2 0.2 - 1.2 mg/dL    Alkaline Phosphatase 47 35 - 144 U/L    AST 14 10 - 35 U/L    ALT 14 9 - 46 U/L   CBC and differential    Collection Time: 07/31/23  7:00 AM   Result Value Ref Range    White Blood Cell Count 5.6 3.8 - 10.8 Thousand/uL    Red Blood Cell Count 5.04 4.20 - 5.80 Million/uL    Hemoglobin 15.6 13.2 - 17.1 g/dL    HCT 46.5 38.5 - 50.0 %    MCV 92.3 80.0 - 100.0 fL    MCH 31.0 27.0 - 33.0 pg    MCHC 33.5 32.0 - 36.0 g/dL    RDW 13.0 11.0 - 15.0 %    Platelet Count 118 405 - 400 Thousand/uL    SL AMB MPV 10.1 7.5 - 12.5 fL    Neutrophils (Absolute) 2,425 1,500 - 7,800 cells/uL    Lymphocytes (Absolute) 2,173 850 - 3,900 cells/uL    Monocytes (Absolute) 622 200 - 950 cells/uL    Eosinophils (Absolute) 342 15 - 500 cells/uL    Basophils ABS 39 0 - 200 cells/uL    Neutrophils 43.3 %    Lymphocytes 38.8 %    Monocytes 11.1 %    Eosinophils 6.1 %    Basophils PCT 0.7 %   TSH, 3rd generation with Free T4 reflex    Collection Time: 07/31/23  7:00 AM   Result Value Ref Range    TSH W/RFX TO FREE T4 5.35 (H) 0.40 - 4.50 mIU/L    Free t4 1.4 0.8 - 1.8 ng/dL       Assessment/Plan:    Other specified hypothyroidism  Increased dose of levothyroxine to 137 mcg daily and repeat thyroid function studies to be done in January    Encounter for annual physical exam  Annual physical exam completed    Borderline hyperlipidemia  Once again really stressed to the patient that he needs to watch his dietary intake of fats and cholesterol. Reduce his intake of saturated fats. Increase his intake of omega-3 fatty acids. Repeat lipid panel in another 5 months. If there is not a substantial improvement then I would recommend statin therapy. Patient is aware and verbalized good understanding            Problem List Items Addressed This Visit        Endocrine    Other specified hypothyroidism     Increased dose of levothyroxine to 137 mcg daily and repeat thyroid function studies to be done in January         Relevant Medications    levothyroxine 137 mcg tablet    Other Relevant Orders    TSH, 3rd generation with Free T4 reflex       Other    Borderline hyperlipidemia     Once again really stressed to the patient that he needs to watch his dietary intake of fats and cholesterol. Reduce his intake of saturated fats. Increase his intake of omega-3 fatty acids. Repeat lipid panel in another 5 months. If there is not a substantial improvement then I would recommend statin therapy.   Patient is aware and verbalized good understanding         Relevant Orders    Comprehensive metabolic panel    Lipid panel    Encounter for annual physical exam - Primary     Annual physical exam completed         Relevant Orders    CBC and differential   Other Visit Diagnoses     Hypothyroidism, unspecified type        Relevant Medications    levothyroxine 137 mcg tablet    Other Relevant Orders    TSH, 3rd generation with Free T4 reflex

## 2023-08-28 ENCOUNTER — TELEPHONE (OUTPATIENT)
Dept: FAMILY MEDICINE CLINIC | Facility: CLINIC | Age: 63
End: 2023-08-28

## 2023-08-28 NOTE — TELEPHONE ENCOUNTER
Pt called questioning what he was billed at his last OV. I explained he was billed for a physical and an office visit.  He was not happy that he was billed for a OV he said he would keep his comments to himself and disconnected the call

## 2024-01-05 LAB
ALBUMIN SERPL-MCNC: 4.4 G/DL (ref 3.6–5.1)
ALBUMIN/GLOB SERPL: 1.5 (CALC) (ref 1–2.5)
ALP SERPL-CCNC: 47 U/L (ref 35–144)
ALT SERPL-CCNC: 19 U/L (ref 9–46)
AST SERPL-CCNC: 18 U/L (ref 10–35)
BASOPHILS # BLD AUTO: 58 CELLS/UL (ref 0–200)
BASOPHILS NFR BLD AUTO: 1.1 %
BILIRUB SERPL-MCNC: 2 MG/DL (ref 0.2–1.2)
BUN SERPL-MCNC: 14 MG/DL (ref 7–25)
BUN/CREAT SERPL: ABNORMAL (CALC) (ref 6–22)
CALCIUM SERPL-MCNC: 9.8 MG/DL (ref 8.6–10.3)
CHLORIDE SERPL-SCNC: 103 MMOL/L (ref 98–110)
CHOLEST SERPL-MCNC: 261 MG/DL
CHOLEST/HDLC SERPL: 4.7 (CALC)
CO2 SERPL-SCNC: 29 MMOL/L (ref 20–32)
CREAT SERPL-MCNC: 1 MG/DL (ref 0.7–1.35)
EOSINOPHIL # BLD AUTO: 239 CELLS/UL (ref 15–500)
EOSINOPHIL NFR BLD AUTO: 4.5 %
ERYTHROCYTE [DISTWIDTH] IN BLOOD BY AUTOMATED COUNT: 13.1 % (ref 11–15)
GFR/BSA.PRED SERPLBLD CYS-BASED-ARV: 84 ML/MIN/1.73M2
GLOBULIN SER CALC-MCNC: 2.9 G/DL (CALC) (ref 1.9–3.7)
GLUCOSE SERPL-MCNC: 81 MG/DL (ref 65–99)
HCT VFR BLD AUTO: 47.8 % (ref 38.5–50)
HDLC SERPL-MCNC: 56 MG/DL
HGB BLD-MCNC: 16.5 G/DL (ref 13.2–17.1)
LDLC SERPL CALC-MCNC: 176 MG/DL (CALC)
LYMPHOCYTES # BLD AUTO: 2019 CELLS/UL (ref 850–3900)
LYMPHOCYTES NFR BLD AUTO: 38.1 %
MCH RBC QN AUTO: 31.3 PG (ref 27–33)
MCHC RBC AUTO-ENTMCNC: 34.5 G/DL (ref 32–36)
MCV RBC AUTO: 90.5 FL (ref 80–100)
MONOCYTES # BLD AUTO: 541 CELLS/UL (ref 200–950)
MONOCYTES NFR BLD AUTO: 10.2 %
NEUTROPHILS # BLD AUTO: 2443 CELLS/UL (ref 1500–7800)
NEUTROPHILS NFR BLD AUTO: 46.1 %
NONHDLC SERPL-MCNC: 205 MG/DL (CALC)
PLATELET # BLD AUTO: 234 THOUSAND/UL (ref 140–400)
PMV BLD REES-ECKER: 10 FL (ref 7.5–12.5)
POTASSIUM SERPL-SCNC: 4.4 MMOL/L (ref 3.5–5.3)
PROT SERPL-MCNC: 7.3 G/DL (ref 6.1–8.1)
RBC # BLD AUTO: 5.28 MILLION/UL (ref 4.2–5.8)
SODIUM SERPL-SCNC: 140 MMOL/L (ref 135–146)
TRIGL SERPL-MCNC: 148 MG/DL
TSH SERPL-ACNC: 3.31 MIU/L (ref 0.4–4.5)
WBC # BLD AUTO: 5.3 THOUSAND/UL (ref 3.8–10.8)

## 2024-01-15 ENCOUNTER — OFFICE VISIT (OUTPATIENT)
Dept: FAMILY MEDICINE CLINIC | Facility: CLINIC | Age: 64
End: 2024-01-15
Payer: COMMERCIAL

## 2024-01-15 VITALS
OXYGEN SATURATION: 100 % | BODY MASS INDEX: 26.51 KG/M2 | WEIGHT: 200 LBS | SYSTOLIC BLOOD PRESSURE: 142 MMHG | RESPIRATION RATE: 16 BRPM | HEIGHT: 73 IN | DIASTOLIC BLOOD PRESSURE: 78 MMHG | HEART RATE: 58 BPM

## 2024-01-15 DIAGNOSIS — E03.8 OTHER SPECIFIED HYPOTHYROIDISM: Primary | ICD-10-CM

## 2024-01-15 DIAGNOSIS — I83.93 ASYMPTOMATIC VARICOSE VEINS OF BOTH LOWER EXTREMITIES: ICD-10-CM

## 2024-01-15 DIAGNOSIS — E78.2 MIXED HYPERLIPIDEMIA: ICD-10-CM

## 2024-01-15 DIAGNOSIS — E03.9 HYPOTHYROIDISM, UNSPECIFIED TYPE: ICD-10-CM

## 2024-01-15 DIAGNOSIS — Z12.5 PROSTATE CANCER SCREENING: ICD-10-CM

## 2024-01-15 PROBLEM — E78.5 BORDERLINE HYPERLIPIDEMIA: Status: RESOLVED | Noted: 2021-04-15 | Resolved: 2024-01-15

## 2024-01-15 PROCEDURE — 99214 OFFICE O/P EST MOD 30 MIN: CPT | Performed by: FAMILY MEDICINE

## 2024-01-15 RX ORDER — LEVOTHYROXINE SODIUM 137 UG/1
137 TABLET ORAL DAILY
Qty: 90 TABLET | Refills: 1 | Status: SHIPPED | OUTPATIENT
Start: 2024-01-15

## 2024-01-15 NOTE — ASSESSMENT & PLAN NOTE
Patient would like to make dietary changes.  He will be willing to go on Zetia or fenofibrate.  Prior to doing any medication treatment I would like to order CT coronary calcium score to see what his potential coronary burden is.  That may have a better influence on patient starting statin therapy which would be preferential

## 2024-01-15 NOTE — ASSESSMENT & PLAN NOTE
Well-controlled on levothyroxine 137 mcg once daily.  Continue same dosage.  Repeat thyroid function testing in 6 months

## 2024-01-15 NOTE — PROGRESS NOTES
Subjective:      Patient ID: Jarett Brandt is a 63 y.o. male.    6-month follow-up of chronic conditions.  Patient has gained weight.  Admits to some dietary indiscretion over the holidays.  Cholesterol is highest that it has been in for years.  Total cholesterol 261, .  Normalized TSH on levothyroxine 137 mcg once daily.  Overall feeling well.  Does have varicose veins which most the time have been asymptomatic.  He did bump 1 in the shower which did cause quite a bit of bleeding that subsided on its own.        Past Medical History:   Diagnosis Date   • Disease of thyroid gland        Family History   Problem Relation Age of Onset   • Hypertension Mother    • Colon cancer Mother    • Colon cancer Father         ascending   • Heart attack Father         MI       Past Surgical History:   Procedure Laterality Date   • [place] COLONOSCOPY FLX DX W/COLLJ SPEC WHEN PFRMD N/A 5/2/2016    Procedure: COLONOSCOPY;  Surgeon: Zenobia Haque MD;  Location: [facility];  Service: Gastroenterology   • TONSILLECTOMY          reports that he has quit smoking. He has never used smokeless tobacco. He reports current alcohol use. He reports that he does not use drugs.      Current Outpatient Medications:   •  Azelaic Acid 15 % cream, , Disp: , Rfl:   •  levothyroxine 137 mcg tablet, Take 1 tablet (137 mcg total) by mouth daily, Disp: 90 tablet, Rfl: 1  •  minocycline (MINOCIN) 100 mg capsule, , Disp: , Rfl:     The following portions of the patient's history were reviewed and updated as appropriate: allergies, current medications, past family history, past medical history, past social history, past surgical history and problem list.    Review of Systems   Constitutional: Negative.    HENT: Negative.     Eyes: Negative.    Respiratory: Negative.     Cardiovascular: Negative.    Gastrointestinal: Negative.    Endocrine: Negative.    Genitourinary: Negative.    Musculoskeletal: Negative.    Skin: Negative.    Allergic/Immunologic:  "Negative.    Neurological: Negative.    Hematological: Negative.    Psychiatric/Behavioral: Negative.     All other systems reviewed and are negative.          Objective:    /78 (BP Location: Right arm, Patient Position: Sitting, Cuff Size: Large)   Pulse 58   Resp 16   Ht 6' 1\" (1.854 m)   Wt 90.7 kg (200 lb)   SpO2 100%   BMI 26.39 kg/m²      Physical Exam  Vitals and nursing note reviewed.   Constitutional:       General: He is not in acute distress.     Appearance: Normal appearance. He is well-developed and normal weight. He is not ill-appearing.   HENT:      Head: Normocephalic and atraumatic.      Right Ear: Tympanic membrane, ear canal and external ear normal.      Left Ear: Tympanic membrane, ear canal and external ear normal.      Nose: Nose normal.      Mouth/Throat:      Mouth: Mucous membranes are moist.   Eyes:      Extraocular Movements: Extraocular movements intact.      Conjunctiva/sclera: Conjunctivae normal.      Pupils: Pupils are equal, round, and reactive to light.   Cardiovascular:      Rate and Rhythm: Normal rate and regular rhythm.      Pulses: Normal pulses.      Heart sounds: Normal heart sounds. No murmur heard.  Pulmonary:      Effort: Pulmonary effort is normal.      Breath sounds: Normal breath sounds.   Abdominal:      General: Abdomen is flat. Bowel sounds are normal.      Palpations: Abdomen is soft.   Musculoskeletal:         General: Normal range of motion.      Cervical back: Normal range of motion and neck supple.   Skin:     General: Skin is warm and dry.      Comments: Superficial varicose veins bilateral lower extremities   Neurological:      General: No focal deficit present.      Mental Status: He is alert and oriented to person, place, and time.   Psychiatric:         Mood and Affect: Mood normal.         Behavior: Behavior normal.         Thought Content: Thought content normal.         Judgment: Judgment normal.           Recent Results (from the past 1008 " hour(s))   Lipid panel    Collection Time: 01/05/24  7:09 AM   Result Value Ref Range    Total Cholesterol 261 (H) <200 mg/dL    HDL 56 > OR = 40 mg/dL    Triglycerides 148 <150 mg/dL    LDL Calculated 176 (H) mg/dL (calc)    Chol HDLC Ratio 4.7 <5.0 (calc)    Non-HDL Cholesterol 205 (H) <130 mg/dL (calc)   Comprehensive metabolic panel    Collection Time: 01/05/24  7:09 AM   Result Value Ref Range    Glucose, Random 81 65 - 99 mg/dL    BUN 14 7 - 25 mg/dL    Creatinine 1.00 0.70 - 1.35 mg/dL    eGFR 84 > OR = 60 mL/min/1.73m2    SL AMB BUN/CREATININE RATIO SEE NOTE: 6 - 22 (calc)    Sodium 140 135 - 146 mmol/L    Potassium 4.4 3.5 - 5.3 mmol/L    Chloride 103 98 - 110 mmol/L    CO2 29 20 - 32 mmol/L    Calcium 9.8 8.6 - 10.3 mg/dL    Protein, Total 7.3 6.1 - 8.1 g/dL    Albumin 4.4 3.6 - 5.1 g/dL    Globulin 2.9 1.9 - 3.7 g/dL (calc)    Albumin/Globulin Ratio 1.5 1.0 - 2.5 (calc)    TOTAL BILIRUBIN 2.0 (H) 0.2 - 1.2 mg/dL    Alkaline Phosphatase 47 35 - 144 U/L    AST 18 10 - 35 U/L    ALT 19 9 - 46 U/L   CBC and differential    Collection Time: 01/05/24  7:09 AM   Result Value Ref Range    White Blood Cell Count 5.3 3.8 - 10.8 Thousand/uL    Red Blood Cell Count 5.28 4.20 - 5.80 Million/uL    Hemoglobin 16.5 13.2 - 17.1 g/dL    HCT 47.8 38.5 - 50.0 %    MCV 90.5 80.0 - 100.0 fL    MCH 31.3 27.0 - 33.0 pg    MCHC 34.5 32.0 - 36.0 g/dL    RDW 13.1 11.0 - 15.0 %    Platelet Count 234 140 - 400 Thousand/uL    SL AMB MPV 10.0 7.5 - 12.5 fL    Neutrophils (Absolute) 2,443 1,500 - 7,800 cells/uL    Lymphocytes (Absolute) 2,019 850 - 3,900 cells/uL    Monocytes (Absolute) 541 200 - 950 cells/uL    Eosinophils (Absolute) 239 15 - 500 cells/uL    Basophils ABS 58 0 - 200 cells/uL    Neutrophils 46.1 %    Lymphocytes 38.1 %    Monocytes 10.2 %    Eosinophils 4.5 %    Basophils PCT 1.1 %   TSH, 3rd generation with Free T4 reflex    Collection Time: 01/05/24  7:09 AM   Result Value Ref Range    TSH W/RFX TO FREE T4 3.31 0.40 -  4.50 mIU/L       Assessment/Plan:    Other specified hypothyroidism  Well-controlled on levothyroxine 137 mcg once daily.  Continue same dosage.  Repeat thyroid function testing in 6 months    Varicose veins of both lower extremities  Patient would like to see vascular surgeon in consultation for possible treatment    Prostate cancer screening  Screening PSA ordered    Mixed hyperlipidemia  Patient would like to make dietary changes.  He will be willing to go on Zetia or fenofibrate.  Prior to doing any medication treatment I would like to order CT coronary calcium score to see what his potential coronary burden is.  That may have a better influence on patient starting statin therapy which would be preferential          Problem List Items Addressed This Visit        Endocrine    Other specified hypothyroidism - Primary     Well-controlled on levothyroxine 137 mcg once daily.  Continue same dosage.  Repeat thyroid function testing in 6 months         Relevant Medications    levothyroxine 137 mcg tablet    Other Relevant Orders    CBC and differential       Cardiovascular and Mediastinum    Varicose veins of both lower extremities     Patient would like to see vascular surgeon in consultation for possible treatment         Relevant Orders    Ambulatory Referral to Vascular Surgery       Other    Mixed hyperlipidemia     Patient would like to make dietary changes.  He will be willing to go on Zetia or fenofibrate.  Prior to doing any medication treatment I would like to order CT coronary calcium score to see what his potential coronary burden is.  That may have a better influence on patient starting statin therapy which would be preferential         Relevant Orders    CT coronary calcium score    CBC and differential    Comprehensive metabolic panel    Lipid panel    Prostate cancer screening     Screening PSA ordered         Relevant Orders    PSA, Total Screen   Other Visit Diagnoses     Hypothyroidism, unspecified  type        Relevant Medications    levothyroxine 137 mcg tablet    Other Relevant Orders    TSH, 3rd generation with Free T4 reflex

## 2024-01-29 ENCOUNTER — HOSPITAL ENCOUNTER (OUTPATIENT)
Dept: CT IMAGING | Facility: HOSPITAL | Age: 64
Discharge: HOME | End: 2024-01-29
Payer: COMMERCIAL

## 2024-01-29 DIAGNOSIS — E78.2 MIXED HYPERLIPIDEMIA: ICD-10-CM

## 2024-01-29 PROCEDURE — G1004 CDSM NDSC: HCPCS

## 2024-01-29 PROCEDURE — 75571 CT HRT W/O DYE W/CA TEST: CPT

## 2024-02-13 ENCOUNTER — TELEPHONE (OUTPATIENT)
Age: 64
End: 2024-02-13

## 2024-02-13 NOTE — TELEPHONE ENCOUNTER
Patient called that he missed a call yesterday. I found that his results of his CT coronary calcium score is back. Dr Palafox would like to review them at a follow up appointment. It can be virtual. He stated that he will call back as he wants to  discuss with his daughter the type of appointment and when he can schedule it.

## 2024-02-21 PROBLEM — Z12.5 PROSTATE CANCER SCREENING: Status: RESOLVED | Noted: 2024-01-15 | Resolved: 2024-02-21

## 2024-02-22 ENCOUNTER — TELEMEDICINE (OUTPATIENT)
Dept: FAMILY MEDICINE CLINIC | Facility: CLINIC | Age: 64
End: 2024-02-22
Payer: COMMERCIAL

## 2024-02-22 DIAGNOSIS — E78.2 MIXED HYPERLIPIDEMIA: ICD-10-CM

## 2024-02-22 DIAGNOSIS — I25.10 CORONARY ARTERY CALCIFICATION SEEN ON CAT SCAN: Primary | ICD-10-CM

## 2024-02-22 DIAGNOSIS — I77.810 ASCENDING AORTA DILATATION (HCC): ICD-10-CM

## 2024-02-22 PROCEDURE — 99214 OFFICE O/P EST MOD 30 MIN: CPT | Performed by: FAMILY MEDICINE

## 2024-02-22 RX ORDER — ROSUVASTATIN CALCIUM 5 MG/1
5 TABLET, COATED ORAL DAILY
Qty: 90 TABLET | Refills: 1 | Status: SHIPPED | OUTPATIENT
Start: 2024-02-22

## 2024-02-22 NOTE — ASSESSMENT & PLAN NOTE
Patient has coronary artery calcification with a CT coronary calcium score of 51 with predominance in the left anterior descending artery.  Recommended that he start on rosuvastatin 5 mg once daily to lower his cholesterol.  He remains on over-the-counter fish oil supplementation and he will have repeat CT coronary calcium score performed in 2 to 3 years

## 2024-02-22 NOTE — ASSESSMENT & PLAN NOTE
Patient CT coronary calcium score is not 0 and he does have a score of 46 in the left anterior descending artery.  This is not significantly elevated but is still a nonzero score.  Recommend going on statin therapy with rosuvastatin 5 mg once daily prescription will be sent to pharmacy.  He will have repeat lipid panel done in July as scheduled

## 2024-02-22 NOTE — ASSESSMENT & PLAN NOTE
Ascending aorta-measured 41 mm on CT scan and it is recommended that he have repeat noncontrast CT scan of the chest performed in 1 year to reevaluate.  Order placed

## 2024-02-22 NOTE — PROGRESS NOTES
Virtual Regular Visit    Verification of patient location:    Patient is located at Home in the following state in which I hold an active license [place]      Assessment/Plan:    Problem List Items Addressed This Visit        Cardiovascular and Mediastinum    Ascending aorta dilatation (HCC)     Ascending aorta-measured 41 mm on CT scan and it is recommended that he have repeat noncontrast CT scan of the chest performed in 1 year to reevaluate.  Order placed         Relevant Orders    CT chest wo contrast    Coronary artery calcification seen on CAT scan - Primary     Patient has coronary artery calcification with a CT coronary calcium score of 51 with predominance in the left anterior descending artery.  Recommended that he start on rosuvastatin 5 mg once daily to lower his cholesterol.  He remains on over-the-counter fish oil supplementation and he will have repeat CT coronary calcium score performed in 2 to 3 years            Other    Mixed hyperlipidemia     Patient CT coronary calcium score is not 0 and he does have a score of 46 in the left anterior descending artery.  This is not significantly elevated but is still a nonzero score.  Recommend going on statin therapy with rosuvastatin 5 mg once daily prescription will be sent to pharmacy.  He will have repeat lipid panel done in July as scheduled         Relevant Medications    rosuvastatin (CRESTOR) 5 mg tablet            Reason for visit is   Chief Complaint   Patient presents with   • Virtual Regular Visit          Encounter provider Arsh Palafox DO    Provider located at [address] [place] [id number]      Recent Visits  No visits were found meeting these conditions.  Showing recent visits within past 7 days and meeting all other requirements  Today's Visits  Date Type Provider Dept   02/22/24 Telemedicine Arsh Palafox DO [facility]   Showing today's visits and meeting all other  requirements  Future Appointments  No visits were found meeting these conditions.  Showing future appointments within next 150 days and meeting all other requirements       The patient was identified by name and date of birth. Jarett Brandt was informed that this is a telemedicine visit and that the visit is being conducted through the [employer] platform. He agrees to proceed..  My office door was closed. No one else was in the room.  He acknowledged consent and understanding of privacy and security of the video platform. The patient has agreed to participate and understands they can discontinue the visit at any time.    Patient is aware this is a billable service.     Subjective  Jarett Brandt is a 63 y.o. male  .      63-year-old male with past medical history of hyperlipidemia presents via video virtual visit to review results CT coronary calcium score testing.  CT coronary calcium score of 51 with predominance of calcification in left third descending artery and ascending aorta ectasia measured 41 mm.  Recommended to have repeat CT scan to evaluate ascending aorta in 1 year         Past Medical History:   Diagnosis Date   • Disease of thyroid gland        Past Surgical History:   Procedure Laterality Date   • [place] COLONOSCOPY FLX DX W/COLLJ SPEC WHEN PFRMD N/A 5/2/2016    Procedure: COLONOSCOPY;  Surgeon: Zenobia Haque MD;  Location: [facility];  Service: Gastroenterology   • TONSILLECTOMY         Current Outpatient Medications   Medication Sig Dispense Refill   • rosuvastatin (CRESTOR) 5 mg tablet Take 1 tablet (5 mg total) by mouth daily 90 tablet 1   • Azelaic Acid 15 % cream      • levothyroxine 137 mcg tablet Take 1 tablet (137 mcg total) by mouth daily 90 tablet 1   • minocycline (MINOCIN) 100 mg capsule        No current facility-administered medications for this visit.        No Known Allergies    Review of Systems   Constitutional: Negative.    HENT: Negative.     Eyes: Negative.    Respiratory:  Negative.     Cardiovascular: Negative.    Gastrointestinal: Negative.    Endocrine: Negative.    Genitourinary: Negative.    Musculoskeletal: Negative.    Skin: Negative.    Allergic/Immunologic: Negative.    Neurological: Negative.    Hematological: Negative.    Psychiatric/Behavioral: Negative.     All other systems reviewed and are negative.      Video Exam    There were no vitals filed for this visit.    Physical Exam  Vitals and nursing note reviewed.   Constitutional:       General: He is not in acute distress.     Appearance: Normal appearance. He is well-developed and normal weight. He is not ill-appearing.   HENT:      Head: Normocephalic and atraumatic.   Eyes:      Extraocular Movements: Extraocular movements intact.      Conjunctiva/sclera: Conjunctivae normal.      Pupils: Pupils are equal, round, and reactive to light.   Pulmonary:      Effort: Pulmonary effort is normal. No respiratory distress.   Neurological:      General: No focal deficit present.      Mental Status: He is alert and oriented to person, place, and time.   Psychiatric:         Mood and Affect: Mood normal.         Behavior: Behavior normal.         Thought Content: Thought content normal.         Judgment: Judgment normal.          Visit Time  Total Visit Duration: 15

## 2024-03-26 ENCOUNTER — TELEPHONE (OUTPATIENT)
Age: 64
End: 2024-03-26

## 2024-03-26 NOTE — TELEPHONE ENCOUNTER
Patient called and stated his insurance company is requiring the rosuvastatin to be prescribed with a 90 day supply.  Verified that the prescription was written with a quantity of 90 days.  Patient will call the pharmacy to follow up as he was given a 30 supply the last time it was filled.

## 2024-03-28 ENCOUNTER — TELEPHONE (OUTPATIENT)
Age: 64
End: 2024-03-28

## 2024-03-28 DIAGNOSIS — E78.2 MIXED HYPERLIPIDEMIA: ICD-10-CM

## 2024-03-28 RX ORDER — ROSUVASTATIN CALCIUM 5 MG/1
5 TABLET, COATED ORAL DAILY
Qty: 90 TABLET | Refills: 1 | Status: SHIPPED | OUTPATIENT
Start: 2024-03-28

## 2024-03-28 NOTE — TELEPHONE ENCOUNTER
Pt requested a 90 day supply  script of the rosuvastatin (CRESTOR) 5 mg tablet . If PCP approves please send to:    [facility]  [address], [place] [address]    Thank you for your help.

## 2024-07-20 DIAGNOSIS — E03.9 HYPOTHYROIDISM, UNSPECIFIED TYPE: ICD-10-CM

## 2024-07-20 RX ORDER — LEVOTHYROXINE SODIUM 137 UG/1
137 TABLET ORAL DAILY
Qty: 90 TABLET | Refills: 1 | Status: SHIPPED | OUTPATIENT
Start: 2024-07-20

## 2024-08-02 LAB
ALBUMIN SERPL-MCNC: 4.4 G/DL (ref 3.6–5.1)
ALBUMIN/GLOB SERPL: 1.7 (CALC) (ref 1–2.5)
ALP SERPL-CCNC: 45 U/L (ref 35–144)
ALT SERPL-CCNC: 16 U/L (ref 9–46)
AST SERPL-CCNC: 17 U/L (ref 10–35)
BASOPHILS # BLD AUTO: 48 CELLS/UL (ref 0–200)
BASOPHILS NFR BLD AUTO: 0.9 %
BILIRUB SERPL-MCNC: 1.6 MG/DL (ref 0.2–1.2)
BUN SERPL-MCNC: 10 MG/DL (ref 7–25)
BUN/CREAT SERPL: ABNORMAL (CALC) (ref 6–22)
CALCIUM SERPL-MCNC: 9.5 MG/DL (ref 8.6–10.3)
CHLORIDE SERPL-SCNC: 105 MMOL/L (ref 98–110)
CHOLEST SERPL-MCNC: 161 MG/DL
CHOLEST/HDLC SERPL: 3.2 (CALC)
CO2 SERPL-SCNC: 29 MMOL/L (ref 20–32)
CREAT SERPL-MCNC: 0.99 MG/DL (ref 0.7–1.35)
EOSINOPHIL # BLD AUTO: 392 CELLS/UL (ref 15–500)
EOSINOPHIL NFR BLD AUTO: 7.4 %
ERYTHROCYTE [DISTWIDTH] IN BLOOD BY AUTOMATED COUNT: 13.1 % (ref 11–15)
GFR/BSA.PRED SERPLBLD CYS-BASED-ARV: 84 ML/MIN/1.73M2
GLOBULIN SER CALC-MCNC: 2.6 G/DL (CALC) (ref 1.9–3.7)
GLUCOSE SERPL-MCNC: 89 MG/DL (ref 65–99)
HCT VFR BLD AUTO: 45.8 % (ref 38.5–50)
HDLC SERPL-MCNC: 51 MG/DL
HGB BLD-MCNC: 15.4 G/DL (ref 13.2–17.1)
LDLC SERPL CALC-MCNC: 87 MG/DL (CALC)
LYMPHOCYTES # BLD AUTO: 2014 CELLS/UL (ref 850–3900)
LYMPHOCYTES NFR BLD AUTO: 38 %
MCH RBC QN AUTO: 30.4 PG (ref 27–33)
MCHC RBC AUTO-ENTMCNC: 33.6 G/DL (ref 32–36)
MCV RBC AUTO: 90.5 FL (ref 80–100)
MONOCYTES # BLD AUTO: 493 CELLS/UL (ref 200–950)
MONOCYTES NFR BLD AUTO: 9.3 %
NEUTROPHILS # BLD AUTO: 2353 CELLS/UL (ref 1500–7800)
NEUTROPHILS NFR BLD AUTO: 44.4 %
NONHDLC SERPL-MCNC: 110 MG/DL (CALC)
PLATELET # BLD AUTO: 211 THOUSAND/UL (ref 140–400)
PMV BLD REES-ECKER: 9.9 FL (ref 7.5–12.5)
POTASSIUM SERPL-SCNC: 4.3 MMOL/L (ref 3.5–5.3)
PROT SERPL-MCNC: 7 G/DL (ref 6.1–8.1)
PSA SERPL-MCNC: 1.13 NG/ML
RBC # BLD AUTO: 5.06 MILLION/UL (ref 4.2–5.8)
SODIUM SERPL-SCNC: 139 MMOL/L (ref 135–146)
TRIGL SERPL-MCNC: 130 MG/DL
TSH SERPL-ACNC: 4.11 MIU/L (ref 0.4–4.5)
WBC # BLD AUTO: 5.3 THOUSAND/UL (ref 3.8–10.8)

## 2024-09-10 ENCOUNTER — OFFICE VISIT (OUTPATIENT)
Dept: FAMILY MEDICINE CLINIC | Facility: CLINIC | Age: 64
End: 2024-09-10
Payer: COMMERCIAL

## 2024-09-10 VITALS
HEIGHT: 73 IN | DIASTOLIC BLOOD PRESSURE: 76 MMHG | WEIGHT: 193 LBS | BODY MASS INDEX: 25.58 KG/M2 | HEART RATE: 68 BPM | RESPIRATION RATE: 14 BRPM | SYSTOLIC BLOOD PRESSURE: 122 MMHG | OXYGEN SATURATION: 98 %

## 2024-09-10 DIAGNOSIS — Z12.5 PROSTATE CANCER SCREENING: ICD-10-CM

## 2024-09-10 DIAGNOSIS — I77.810 ASCENDING AORTA DILATATION (HCC): ICD-10-CM

## 2024-09-10 DIAGNOSIS — E78.2 MIXED HYPERLIPIDEMIA: ICD-10-CM

## 2024-09-10 DIAGNOSIS — E03.8 OTHER SPECIFIED HYPOTHYROIDISM: ICD-10-CM

## 2024-09-10 DIAGNOSIS — Z00.00 ENCOUNTER FOR ANNUAL PHYSICAL EXAM: Primary | ICD-10-CM

## 2024-09-10 DIAGNOSIS — I25.10 CORONARY ARTERY CALCIFICATION SEEN ON CAT SCAN: ICD-10-CM

## 2024-09-10 PROCEDURE — 99397 PER PM REEVAL EST PAT 65+ YR: CPT | Performed by: FAMILY MEDICINE

## 2024-09-10 PROCEDURE — 99213 OFFICE O/P EST LOW 20 MIN: CPT | Performed by: FAMILY MEDICINE

## 2024-09-10 RX ORDER — ROSUVASTATIN CALCIUM 5 MG/1
5 TABLET, COATED ORAL DAILY
Qty: 90 TABLET | Refills: 1 | Status: SHIPPED | OUTPATIENT
Start: 2024-09-10 | End: 2024-09-11 | Stop reason: SDUPTHER

## 2024-09-10 NOTE — ASSESSMENT & PLAN NOTE
Annual physical examination completed    -Patient is current on screenings    -Patient declines flu vaccination and pneumonia vaccination

## 2024-09-10 NOTE — ASSESSMENT & PLAN NOTE
Hypothyroidism remains very well-controlled on levothyroxine 137 mcg once daily.  Continue same.  Repeat thyroid function testing will be done in April

## 2024-09-10 NOTE — PROGRESS NOTES
Subjective:      Patient ID: Jarett Brandt is a 64 y.o. male.    64-year-old male presents for annual physical examination and follow-up of chronic conditions including hypothyroidism, hyperlipidemia, aortic root dilation and coronary artery calcification seen on CT scan.  Patient has lost weight which he attributes to stress of buying a new truck.  He is looking forward to retiring in December at which time he will be spending more time with his grandchildren.  Patient has been taking rosuvastatin with a tremendous decrease in his cholesterol.  He is scheduled in January for CT scan of the chest to reevaluate aortic root which was measured at 4.08 cm.  Declines flu vaccination        Past Medical History:   Diagnosis Date    Disease of thyroid gland        Family History   Problem Relation Age of Onset    Hypertension Mother     Colon cancer Mother     Colon cancer Father         ascending    Heart attack Father         MI       Past Surgical History:   Procedure Laterality Date    MI COLONOSCOPY FLX DX W/COLLJ SPEC WHEN PFRMD N/A 5/2/2016    Procedure: COLONOSCOPY;  Surgeon: Zenobia Haque MD;  Location: [facility];  Service: Gastroenterology    TONSILLECTOMY          reports that he has quit smoking. He has never used smokeless tobacco. He reports current alcohol use. He reports that he does not use drugs.      Current Outpatient Medications:     Azelaic Acid 15 % cream, , Disp: , Rfl:     levothyroxine 137 mcg tablet, Take 1 tablet by mouth once daily, Disp: 90 tablet, Rfl: 1    rosuvastatin (CRESTOR) 5 mg tablet, Take 1 tablet (5 mg total) by mouth daily, Disp: 90 tablet, Rfl: 1    diclofenac sodium (VOLTAREN) 50 mg EC tablet, Take 50 mg by mouth 3 (three) times a day as needed (Patient not taking: Reported on 9/10/2024), Disp: , Rfl:     minocycline (MINOCIN) 100 mg capsule, , Disp: , Rfl:     The following portions of the patient's history were reviewed and updated as appropriate: allergies, current  "medications, past family history, past medical history, past social history, past surgical history and problem list.    Review of Systems   Constitutional:  Positive for unexpected weight change (7 pound weight loss).   HENT: Negative.     Eyes: Negative.    Respiratory: Negative.     Cardiovascular: Negative.    Gastrointestinal: Negative.    Endocrine: Negative.    Genitourinary: Negative.    Musculoskeletal: Negative.    Skin: Negative.    Allergic/Immunologic: Negative.    Neurological: Negative.    Hematological: Negative.    Psychiatric/Behavioral: Negative.     All other systems reviewed and are negative.          Objective:    /76   Pulse 68   Resp 14   Ht 6' 1\" (1.854 m)   Wt 87.5 kg (193 lb)   SpO2 98%   BMI 25.46 kg/m²      Physical Exam  Vitals and nursing note reviewed.   Constitutional:       General: He is not in acute distress.     Appearance: Normal appearance. He is well-developed and normal weight. He is not ill-appearing.   HENT:      Head: Normocephalic and atraumatic.      Right Ear: Tympanic membrane, ear canal and external ear normal.      Left Ear: Tympanic membrane, ear canal and external ear normal.      Nose: Nose normal.      Mouth/Throat:      Mouth: Mucous membranes are moist.   Eyes:      Extraocular Movements: Extraocular movements intact.      Conjunctiva/sclera: Conjunctivae normal.      Pupils: Pupils are equal, round, and reactive to light.   Cardiovascular:      Rate and Rhythm: Normal rate and regular rhythm.      Pulses: Normal pulses.      Heart sounds: Normal heart sounds. No murmur heard.  Pulmonary:      Effort: Pulmonary effort is normal.      Breath sounds: Normal breath sounds.   Abdominal:      General: Abdomen is flat. Bowel sounds are normal.      Palpations: Abdomen is soft.   Musculoskeletal:         General: Normal range of motion.      Cervical back: Normal range of motion and neck supple.   Skin:     General: Skin is warm and dry.   Neurological:     "  General: No focal deficit present.      Mental Status: He is alert and oriented to person, place, and time.   Psychiatric:         Mood and Affect: Mood normal.         Behavior: Behavior normal.         Thought Content: Thought content normal.         Judgment: Judgment normal.           Recent Results (from the past 1008 hour(s))   Lipid panel    Collection Time: 08/02/24  7:15 AM   Result Value Ref Range    Total Cholesterol 161 <200 mg/dL    HDL 51 > OR = 40 mg/dL    Triglycerides 130 <150 mg/dL    LDL Calculated 87 mg/dL (calc)    Chol HDLC Ratio 3.2 <5.0 (calc)    Non-HDL Cholesterol 110 <130 mg/dL (calc)   Comprehensive metabolic panel    Collection Time: 08/02/24  7:15 AM   Result Value Ref Range    Glucose, Random 89 65 - 99 mg/dL    BUN 10 7 - 25 mg/dL    Creatinine 0.99 0.70 - 1.35 mg/dL    eGFR 84 > OR = 60 mL/min/1.73m2    SL AMB BUN/CREATININE RATIO SEE NOTE: 6 - 22 (calc)    Sodium 139 135 - 146 mmol/L    Potassium 4.3 3.5 - 5.3 mmol/L    Chloride 105 98 - 110 mmol/L    CO2 29 20 - 32 mmol/L    Calcium 9.5 8.6 - 10.3 mg/dL    Protein, Total 7.0 6.1 - 8.1 g/dL    Albumin 4.4 3.6 - 5.1 g/dL    Globulin 2.6 1.9 - 3.7 g/dL (calc)    Albumin/Globulin Ratio 1.7 1.0 - 2.5 (calc)    TOTAL BILIRUBIN 1.6 (H) 0.2 - 1.2 mg/dL    Alkaline Phosphatase 45 35 - 144 U/L    AST 17 10 - 35 U/L    ALT 16 9 - 46 U/L   CBC and differential    Collection Time: 08/02/24  7:15 AM   Result Value Ref Range    White Blood Cell Count 5.3 3.8 - 10.8 Thousand/uL    Red Blood Cell Count 5.06 4.20 - 5.80 Million/uL    Hemoglobin 15.4 13.2 - 17.1 g/dL    HCT 45.8 38.5 - 50.0 %    MCV 90.5 80.0 - 100.0 fL    MCH 30.4 27.0 - 33.0 pg    MCHC 33.6 32.0 - 36.0 g/dL    RDW 13.1 11.0 - 15.0 %    Platelet Count 211 140 - 400 Thousand/uL    SL AMB MPV 9.9 7.5 - 12.5 fL    Neutrophils (Absolute) 2,353 1,500 - 7,800 cells/uL    Lymphocytes (Absolute) 2,014 850 - 3,900 cells/uL    Monocytes (Absolute) 493 200 - 950 cells/uL    Eosinophils  (Absolute) 392 15 - 500 cells/uL    Basophils ABS 48 0 - 200 cells/uL    Neutrophils 44.4 %    Lymphocytes 38.0 %    Monocytes 9.3 %    Eosinophils 7.4 %    Basophils PCT 0.9 %   PSA, Total Screen    Collection Time: 08/02/24  7:15 AM   Result Value Ref Range    Prostate Specific Antigen Total 1.13 < OR = 4.00 ng/mL   TSH, 3rd generation with Free T4 reflex    Collection Time: 08/02/24  7:15 AM   Result Value Ref Range    TSH W/RFX TO FREE T4 4.11 0.40 - 4.50 mIU/L       Assessment/Plan:    Coronary artery calcification seen on CAT scan  CT coronary calcium score of 51 with predominance in the left anterior descending artery.  He is on rosuvastatin 5 mg once daily to lower his cholesterol.  Repeat CT coronary calcium score should be done in February 2026    Ascending aorta dilatation (HCC)  I did show patient images from CT scan.  Ascending aorta measured 41 mm.  Repeat noncontrast CT scan to be done this winter to reevaluate for stability    Other specified hypothyroidism  Hypothyroidism remains very well-controlled on levothyroxine 137 mcg once daily.  Continue same.  Repeat thyroid function testing will be done in April    Mixed hyperlipidemia  Tremendous improvement in cholesterol on rosuvastatin 5 mg once daily.  Continue same dosage.  Refill provided.  Repeat lipid panel to be done in April    Encounter for annual physical exam  Annual physical examination completed    -Patient is current on screenings    -Patient declines flu vaccination and pneumonia vaccination          Problem List Items Addressed This Visit          Cardiovascular and Mediastinum    Ascending aorta dilatation (HCC)     I did show patient images from CT scan.  Ascending aorta measured 41 mm.  Repeat noncontrast CT scan to be done this winter to reevaluate for stability         Coronary artery calcification seen on CAT scan     CT coronary calcium score of 51 with predominance in the left anterior descending artery.  He is on rosuvastatin 5  mg once daily to lower his cholesterol.  Repeat CT coronary calcium score should be done in February 2026         Relevant Orders    CBC and differential       Endocrine    Other specified hypothyroidism     Hypothyroidism remains very well-controlled on levothyroxine 137 mcg once daily.  Continue same.  Repeat thyroid function testing will be done in April         Relevant Orders    TSH, 3rd generation with Free T4 reflex       Other    Encounter for annual physical exam - Primary     Annual physical examination completed    -Patient is current on screenings    -Patient declines flu vaccination and pneumonia vaccination         Mixed hyperlipidemia     Tremendous improvement in cholesterol on rosuvastatin 5 mg once daily.  Continue same dosage.  Refill provided.  Repeat lipid panel to be done in April         Relevant Medications    rosuvastatin (CRESTOR) 5 mg tablet    Other Relevant Orders    Comprehensive metabolic panel    Lipid panel     Other Visit Diagnoses       Prostate cancer screening        Relevant Orders    PSA, Total Screen

## 2024-09-10 NOTE — ASSESSMENT & PLAN NOTE
Tremendous improvement in cholesterol on rosuvastatin 5 mg once daily.  Continue same dosage.  Refill provided.  Repeat lipid panel to be done in April

## 2024-09-10 NOTE — ASSESSMENT & PLAN NOTE
I did show patient images from CT scan.  Ascending aorta measured 41 mm.  Repeat noncontrast CT scan to be done this winter to reevaluate for stability

## 2024-09-10 NOTE — ASSESSMENT & PLAN NOTE
CT coronary calcium score of 51 with predominance in the left anterior descending artery.  He is on rosuvastatin 5 mg once daily to lower his cholesterol.  Repeat CT coronary calcium score should be done in February 2026

## 2024-09-11 DIAGNOSIS — E78.2 MIXED HYPERLIPIDEMIA: ICD-10-CM

## 2024-09-11 RX ORDER — ROSUVASTATIN CALCIUM 5 MG/1
5 TABLET, COATED ORAL DAILY
Qty: 90 TABLET | Refills: 1 | Status: SHIPPED | OUTPATIENT
Start: 2024-09-11

## 2024-09-11 NOTE — TELEPHONE ENCOUNTER
NOT  A DUPLICATE ORDER  MEDICATION SENT TO INCORRECT PHARMACY    Requested Prescriptions     Pending Prescriptions Disp Refills    rosuvastatin (CRESTOR) 5 mg tablet 90 tablet 1     Sig: Take 1 tablet (5 mg total) by mouth daily   Should be sent to   [facility]/pharmacy #[id number] - [place], [place] - [address]

## 2024-12-21 DIAGNOSIS — E03.9 HYPOTHYROIDISM, UNSPECIFIED TYPE: ICD-10-CM

## 2024-12-23 RX ORDER — LEVOTHYROXINE SODIUM 137 UG/1
137 TABLET ORAL DAILY
Qty: 90 TABLET | Refills: 1 | Status: SHIPPED | OUTPATIENT
Start: 2024-12-23

## 2024-12-31 NOTE — ASSESSMENT & PLAN NOTE
Hypothyroidism remains very well controlled on levothyroxine 125 mcg once daily  Continue same  Refill provided    Re-evaluate in 6 months Palpations: Abdomen is soft.      Tenderness: There is no abdominal tenderness. There is no guarding.   Musculoskeletal:      Right lower leg: No edema.      Left lower leg: No edema.   Skin:     Capillary Refill: Capillary refill takes less than 2 seconds.      Coloration: Skin is not pale.      Findings: No rash.   Neurological:      Mental Status: She is alert and oriented to person, place, and time.   Psychiatric:         Behavior: Behavior is cooperative.         DIAGNOSTIC RESULTS     EKG: All EKG's areinterpreted by the Emergency Department Physician who either signs or Co-signs this chart in the absence of a cardiologist.    0417: Normal sinus rhythm at a rate of 84, no evidence of acute ST elevation is identified.  QTc: 414 MS.    0603: Normal sinus rhythm at a rate of 94, no evidence of acute ST elevation is identified.  QTc: 429 MS.    RADIOLOGY:  Non-plain film images such as CT, Ultrasound and MRI are read by the radiologist. Plain radiographic images are visualized and preliminarily interpreted bythe emergency physician with the below findings:    CXR: no infiltrates or ptx noted      XR CHEST PORTABLE    (Results Pending)           LABS:  Labs Reviewed   CBC WITH AUTO DIFFERENTIAL - Abnormal; Notable for the following components:       Result Value    MCHC 31.9 (*)     RDW 14.7 (*)     MPV 9.3 (*)     Neutrophils % 46.2 (*)     Lymphocytes % 40.8 (*)     All other components within normal limits   COMPREHENSIVE METABOLIC PANEL - Abnormal; Notable for the following components:    Glucose 115 (*)     Alkaline Phosphatase 165 (*)     ALT 35 (*)     All other components within normal limits   TROPONIN   TROPONIN       All other labs were within normal range or not returned as of this dictation.    EMERGENCY DEPARTMENT COURSE and DIFFERENTIAL DIAGNOSIS/MDM:   Vitals:    Vitals:    12/31/24 0500 12/31/24 0530 12/31/24 0600 12/31/24 0630   BP: 130/71 (!) 143/79 (!) 141/82 127/73   Pulse: 84 95 92 96   Resp:

## 2025-03-03 ENCOUNTER — TELEPHONE (OUTPATIENT)
Age: 65
End: 2025-03-03

## 2025-03-03 DIAGNOSIS — I77.810 ASCENDING AORTA DILATATION (HCC): Primary | ICD-10-CM

## 2025-03-03 NOTE — TELEPHONE ENCOUNTER
Patient called states his order for CT of chest wo contrast . Would like a new order placed so he can complete. Would like a call when order is placed.

## 2025-03-06 ENCOUNTER — TELEPHONE (OUTPATIENT)
Age: 65
End: 2025-03-06

## 2025-03-17 ENCOUNTER — HOSPITAL ENCOUNTER (OUTPATIENT)
Dept: RADIOLOGY | Facility: MEDICAL CENTER | Age: 65
Discharge: HOME | End: 2025-03-17
Payer: MEDICARE

## 2025-03-17 DIAGNOSIS — I77.810 ASCENDING AORTA DILATATION (HCC): ICD-10-CM

## 2025-03-17 PROCEDURE — 71250 CT THORAX DX C-: CPT

## 2025-03-24 ENCOUNTER — RESULTS FOLLOW-UP (OUTPATIENT)
Dept: FAMILY MEDICINE CLINIC | Facility: CLINIC | Age: 65
End: 2025-03-24

## 2025-03-26 ENCOUNTER — RESULTS FOLLOW-UP (OUTPATIENT)
Dept: FAMILY MEDICINE CLINIC | Facility: CLINIC | Age: 65
End: 2025-03-26

## 2025-03-26 LAB
ALBUMIN SERPL-MCNC: 4.5 G/DL (ref 3.6–5.1)
ALBUMIN/GLOB SERPL: 1.7 (CALC) (ref 1–2.5)
ALP SERPL-CCNC: 49 U/L (ref 35–144)
ALT SERPL-CCNC: 16 U/L (ref 9–46)
AST SERPL-CCNC: 15 U/L (ref 10–35)
BASOPHILS # BLD AUTO: 51 CELLS/UL (ref 0–200)
BASOPHILS NFR BLD AUTO: 0.9 %
BILIRUB SERPL-MCNC: 1.5 MG/DL (ref 0.2–1.2)
BUN SERPL-MCNC: 12 MG/DL (ref 7–25)
BUN/CREAT SERPL: ABNORMAL (CALC) (ref 6–22)
CALCIUM SERPL-MCNC: 9.7 MG/DL (ref 8.6–10.3)
CHLORIDE SERPL-SCNC: 102 MMOL/L (ref 98–110)
CHOLEST SERPL-MCNC: 169 MG/DL
CHOLEST/HDLC SERPL: 3.1 (CALC)
CO2 SERPL-SCNC: 28 MMOL/L (ref 20–32)
CREAT SERPL-MCNC: 0.94 MG/DL (ref 0.7–1.35)
EOSINOPHIL # BLD AUTO: 296 CELLS/UL (ref 15–500)
EOSINOPHIL NFR BLD AUTO: 5.2 %
ERYTHROCYTE [DISTWIDTH] IN BLOOD BY AUTOMATED COUNT: 12.8 % (ref 11–15)
GFR/BSA.PRED SERPLBLD CYS-BASED-ARV: 89 ML/MIN/1.73M2
GLOBULIN SER CALC-MCNC: 2.7 G/DL (CALC) (ref 1.9–3.7)
GLUCOSE SERPL-MCNC: 90 MG/DL (ref 65–99)
HCT VFR BLD AUTO: 50.7 % (ref 38.5–50)
HDLC SERPL-MCNC: 54 MG/DL
HGB BLD-MCNC: 17 G/DL (ref 13.2–17.1)
LDLC SERPL CALC-MCNC: 94 MG/DL (CALC)
LYMPHOCYTES # BLD AUTO: 1710 CELLS/UL (ref 850–3900)
LYMPHOCYTES NFR BLD AUTO: 30 %
MCH RBC QN AUTO: 30.3 PG (ref 27–33)
MCHC RBC AUTO-ENTMCNC: 33.5 G/DL (ref 32–36)
MCV RBC AUTO: 90.4 FL (ref 80–100)
MONOCYTES # BLD AUTO: 576 CELLS/UL (ref 200–950)
MONOCYTES NFR BLD AUTO: 10.1 %
NEUTROPHILS # BLD AUTO: 3067 CELLS/UL (ref 1500–7800)
NEUTROPHILS NFR BLD AUTO: 53.8 %
NONHDLC SERPL-MCNC: 115 MG/DL (CALC)
PLATELET # BLD AUTO: 265 THOUSAND/UL (ref 140–400)
PMV BLD REES-ECKER: 9.6 FL (ref 7.5–12.5)
POTASSIUM SERPL-SCNC: 4.1 MMOL/L (ref 3.5–5.3)
PROT SERPL-MCNC: 7.2 G/DL (ref 6.1–8.1)
PSA SERPL-MCNC: 1.27 NG/ML
RBC # BLD AUTO: 5.61 MILLION/UL (ref 4.2–5.8)
SODIUM SERPL-SCNC: 139 MMOL/L (ref 135–146)
TRIGL SERPL-MCNC: 117 MG/DL
TSH SERPL-ACNC: 1.41 MIU/L (ref 0.4–4.5)
WBC # BLD AUTO: 5.7 THOUSAND/UL (ref 3.8–10.8)

## 2025-04-02 DIAGNOSIS — E78.2 MIXED HYPERLIPIDEMIA: ICD-10-CM

## 2025-04-02 RX ORDER — ROSUVASTATIN CALCIUM 5 MG/1
5 TABLET, COATED ORAL DAILY
Qty: 90 TABLET | Refills: 1 | Status: SHIPPED | OUTPATIENT
Start: 2025-04-02

## 2025-04-07 ENCOUNTER — OFFICE VISIT (OUTPATIENT)
Dept: FAMILY MEDICINE CLINIC | Facility: CLINIC | Age: 65
End: 2025-04-07
Payer: MEDICARE

## 2025-04-07 VITALS
BODY MASS INDEX: 25.18 KG/M2 | DIASTOLIC BLOOD PRESSURE: 76 MMHG | WEIGHT: 190 LBS | HEART RATE: 80 BPM | OXYGEN SATURATION: 98 % | HEIGHT: 73 IN | RESPIRATION RATE: 16 BRPM | SYSTOLIC BLOOD PRESSURE: 124 MMHG

## 2025-04-07 DIAGNOSIS — E03.8 OTHER SPECIFIED HYPOTHYROIDISM: ICD-10-CM

## 2025-04-07 DIAGNOSIS — E78.2 MIXED HYPERLIPIDEMIA: ICD-10-CM

## 2025-04-07 DIAGNOSIS — Z00.00 WELCOME TO MEDICARE PREVENTIVE VISIT: Primary | ICD-10-CM

## 2025-04-07 DIAGNOSIS — F32.9 REACTIVE DEPRESSION (SITUATIONAL): ICD-10-CM

## 2025-04-07 DIAGNOSIS — I77.810 ASCENDING AORTA DILATATION (HCC): ICD-10-CM

## 2025-04-07 PROCEDURE — G0402 INITIAL PREVENTIVE EXAM: HCPCS | Performed by: FAMILY MEDICINE

## 2025-04-07 RX ORDER — MIRTAZAPINE 15 MG/1
15 TABLET, FILM COATED ORAL
Qty: 30 TABLET | Refills: 1 | Status: SHIPPED | OUTPATIENT
Start: 2025-04-07

## 2025-04-07 NOTE — ASSESSMENT & PLAN NOTE
Hypothyroidism remains very well-controlled on levothyroxine 137 mcg once daily.  Continue same.  Repeat thyroid function testing will be done in September  Orders:  •  TSH, 3rd generation with Free T4 reflex; Future

## 2025-04-07 NOTE — PROGRESS NOTES
Name: Jarett Brandt      : 1960      MRN: [id number]  Encounter Provider: Arsh Palafox DO  Encounter Date: 2025   Encounter department: [facility]  :  Assessment & Plan  Welcome to Medicare preventive visit  Welcome to Medicare physical examination performed    Patient is eligible for influenza vaccination, pneumococcal pneumonia vaccination, shingles vaccination  Orders:  •  CBC and differential; Future    Reactive depression (situational)  Referral to psychology for talk therapy    - Patient will be placed on Remeron 15 mg at bedtime to help with some insomnia that he is experiencing as well as his depressive symptoms  Orders:  •  Ambulatory referral to Psych Services; Future  •  mirtazapine (REMERON) 15 mg tablet; Take 1 tablet (15 mg total) by mouth daily at bedtime    Mixed hyperlipidemia  Cholesterol is very well-controlled on rosuvastatin 5 mg once daily.  Continue same.  Reevaluate cholesterol in September  Orders:  •  Comprehensive metabolic panel; Future  •  Lipid panel; Future    Other specified hypothyroidism  Hypothyroidism remains very well-controlled on levothyroxine 137 mcg once daily.  Continue same.  Repeat thyroid function testing will be done in September  Orders:  •  TSH, 3rd generation with Free T4 reflex; Future    Ascending aorta dilatation (HCC)  Ascending aorta ectasia at 41 mm has remained stable.  Repeat CT scan to be done in 1 year.  No hypertension          Preventive health issues were discussed with patient, and age appropriate screening tests were ordered as noted in patient's After Visit Summary. Personalized health advice and appropriate referrals for health education or preventive services given if needed, as noted in patient's After Visit Summary.    History of Present Illness     65-year-old male presents for welcome to Medicare physical examination.  Patient has been retired but has been very busy and stressed in his household.  Patient has  his son who has brain cancer living with him and his 2 children are out the house on weekends.  His daughter is also living with him as well as her 2 children.  2 of his grandchildren have behavioral disorders with 1 having autism and the other having ADHD so it is a little more stressful for him and his wife.  Patient also had spent money on a truck that he ended up returning and then bought a different truck and he regrets that decision.  Tends to focus and ruminate on this.  Did not think that his life would be this complicated at his age and since he retired.  This does impact on how he sleeps at night.  Patient would like to meet with a therapist       Patient Care Team:  Arsh Palafox DO as PCP - General  MD Zenobia Og MD as Endoscopist    Review of Systems   Constitutional: Negative.    HENT: Negative.     Eyes: Negative.    Respiratory: Negative.     Cardiovascular: Negative.    Gastrointestinal: Negative.    Endocrine: Negative.    Genitourinary: Negative.    Musculoskeletal: Negative.    Skin: Negative.    Allergic/Immunologic: Negative.    Neurological: Negative.    Hematological: Negative.    Psychiatric/Behavioral:  Positive for dysphoric mood and sleep disturbance.    All other systems reviewed and are negative.    Medical History Reviewed by provider this encounter:  Problems       Annual Wellness Visit Questionnaire   Jarett is here for his Welcome to Medicare visit.     Health Risk Assessment:   Patient rates overall health as good. Patient feels that their physical health rating is same. Patient is satisfied with their life. Eyesight was rated as same. Hearing was rated as same. Patient feels that their emotional and mental health rating is slightly worse. Patients states they are never, rarely angry. Patient states they are sometimes unusually tired/fatigued. Pain experienced in the last 7 days has been none. Patient states that he has experienced no weight loss or gain in last  6 months.     Depression Screening:   PHQ-2 Score: 2      Fall Risk Screening:   In the past year, patient has experienced: no history of falling in past year      Home Safety:  Patient does not have trouble with stairs inside or outside of their home. Patient has working smoke alarms and has working carbon monoxide detector. Home safety hazards include: not having non-slip bath and/or shower mats.     Nutrition:   Current diet is Regular.     Medications:   Patient is not currently taking any over-the-counter supplements. Patient is able to manage medications.     Activities of Daily Living (ADLs)/Instrumental Activities of Daily Living (IADLs):   Walk and transfer into and out of bed and chair?: Yes  Dress and groom yourself?: Yes    Bathe or shower yourself?: Yes    Feed yourself? Yes  Do your laundry/housekeeping?: Yes  Manage your money, pay your bills and track your expenses?: Yes  Make your own meals?: Yes    Do your own shopping?: Yes    Previous Hospitalizations:   Any hospitalizations or ED visits within the last 12 months?: No      Advance Care Planning:   Living will: Yes    Durable POA for healthcare: Yes    Advanced directive: Yes    Advanced directive counseling given: No    Five wishes given: No    Patient declined ACP directive: No    End of Life Decisions reviewed with patient: Yes    Provider agrees with end of life decisions: Yes      PREVENTIVE SCREENINGS      Cardiovascular Screening:    General: Screening Not Indicated and History Lipid Disorder      Diabetes Screening:     General: Screening Current      Colorectal Cancer Screening:     General: Screening Current      Prostate Cancer Screening:    General: Screening Current      Osteoporosis Screening:    General: Screening Not Indicated      Abdominal Aortic Aneurysm (AAA) Screening:    Risk factors include: age between 65-74 yo and tobacco use        General: Screening Current      Lung Cancer Screening:     General: Screening Not  "Indicated      Hepatitis C Screening:    General: Screening Current    Screening, Brief Intervention, and Referral to Treatment (SBIRT)     Screening  Typical number of drinks in a day: 1  Typical number of drinks in a week: 2  Interpretation: Low risk drinking behavior.    AUDIT-C Screenin) How often did you have a drink containing alcohol in the past year? 2 to 3 times a week  2) How many drinks did you have on a typical day when you were drinking in the past year? 1 to 2  3) How often did you have 6 or more drinks on one occasion in the past year? never    AUDIT-C Score: 3  Interpretation: Score 0-3 (male): Negative screen for alcohol misuse    Single Item Drug Screening:  How often have you used an illegal drug (including marijuana) or a prescription medication for non-medical reasons in the past year? never    Single Item Drug Screen Score: 0  Interpretation: Negative screen for possible drug use disorder    Social Drivers of Health     Food Insecurity: No Food Insecurity (2025)    Hunger Vital Sign    • Worried About Running Out of Food in the Last Year: Never true    • Ran Out of Food in the Last Year: Never true   Transportation Needs: No Transportation Needs (2025)    PRAPARE - Transportation    • Lack of Transportation (Medical): No    • Lack of Transportation (Non-Medical): No   Housing Stability: Low Risk  (2025)    Housing Stability Vital Sign    • Unable to Pay for Housing in the Last Year: No    • Number of Times Moved in the Last Year: 0    • Homeless in the Last Year: No   Utilities: Not At Risk (2025)    [facility] Utilities    • Threatened with loss of utilities: No     Vision Screening    Right eye Left eye Both eyes   Without correction      With correction 20/20 20/20 20/20       Objective   /76   Pulse 80   Resp 16   Ht 6' 1\" (1.854 m)   Wt 86.2 kg (190 lb)   SpO2 98%   BMI 25.07 kg/m²     Physical Exam  Vitals and nursing note reviewed.   Constitutional:       " Appearance: Normal appearance. He is well-developed and normal weight.   HENT:      Head: Normocephalic and atraumatic.      Right Ear: Tympanic membrane, ear canal and external ear normal.      Left Ear: Tympanic membrane, ear canal and external ear normal.      Nose: Nose normal.      Mouth/Throat:      Mouth: Mucous membranes are moist.      Pharynx: Oropharynx is clear.   Eyes:      General: No scleral icterus.        Right eye: No discharge.         Left eye: No discharge.      Extraocular Movements: Extraocular movements intact.      Conjunctiva/sclera: Conjunctivae normal.      Pupils: Pupils are equal, round, and reactive to light.   Cardiovascular:      Rate and Rhythm: Normal rate and regular rhythm.      Pulses: Normal pulses.      Heart sounds: Normal heart sounds.   Pulmonary:      Effort: Pulmonary effort is normal.      Breath sounds: Normal breath sounds.   Abdominal:      General: Bowel sounds are normal.      Palpations: Abdomen is soft.   Genitourinary:     Penis: Normal.       Prostate: Normal.      Rectum: Normal.   Musculoskeletal:         General: Normal range of motion.      Cervical back: Normal range of motion and neck supple.   Skin:     General: Skin is warm and dry.   Neurological:      General: No focal deficit present.      Mental Status: He is alert and oriented to person, place, and time. Mental status is at baseline.   Psychiatric:         Mood and Affect: Mood is depressed.         Behavior: Behavior normal.         Thought Content: Thought content normal.         Judgment: Judgment normal.

## 2025-04-07 NOTE — ASSESSMENT & PLAN NOTE
Welcome to Medicare physical examination performed    Patient is eligible for influenza vaccination, pneumococcal pneumonia vaccination, shingles vaccination  Orders:  •  CBC and differential; Future

## 2025-04-07 NOTE — ASSESSMENT & PLAN NOTE
Ascending aorta ectasia at 41 mm has remained stable.  Repeat CT scan to be done in 1 year.  No hypertension

## 2025-04-07 NOTE — PATIENT INSTRUCTIONS
Medicare Preventive Visit Patient Instructions  Thank you for completing your Welcome to Medicare Visit or Medicare Annual Wellness Visit today. Your next wellness visit will be due in one year (4/8/2026).  The screening/preventive services that you may require over the next 5-10 years are detailed below. Some tests may not apply to you based off risk factors and/or age. Screening tests ordered at today's visit but not completed yet may show as past due. Also, please note that scanned in results may not display below.  Preventive Screenings:  Service Recommendations Previous Testing/Comments   Colorectal Cancer Screening  Colonoscopy    Fecal Occult Blood Test (FOBT)/Fecal Immunochemical Test (FIT)  Fecal DNA/Cologuard Test  Flexible Sigmoidoscopy Age: 45-75 years old   Colonoscopy: every 10 years (May be performed more frequently if at higher risk)  OR  FOBT/FIT: every 1 year  OR  Cologuard: every 3 years  OR  Sigmoidoscopy: every 5 years  Screening may be recommended earlier than age 45 if at higher risk for colorectal cancer. Also, an individualized decision between you and your healthcare provider will decide whether screening between the ages of 76-85 would be appropriate. Colonoscopy: 08/26/2021  FOBT/FIT: Not on file  Cologuard: Not on file  Sigmoidoscopy: Not on file    Screening Current     Prostate Cancer Screening Individualized decision between patient and health care provider in men between ages of 55-69   Medicare will cover every 12 months beginning on the day after your 50th birthday PSA: 1.27 ng/mL     Screening Current     Hepatitis C Screening Once for adults born between 1945 and 1965  More frequently in patients at high risk for Hepatitis C Hep C Antibody: Not on file    Screening Current   Diabetes Screening 1-2 times per year if you're at risk for diabetes or have pre-diabetes Fasting glucose: No results in last 5 years (No results in last 5 years)  A1C: No results in last 5 years (No results  in last 5 years)  Screening Current   Cholesterol Screening Once every 5 years if you don't have a lipid disorder. May order more often based on risk factors. Lipid panel: 03/25/2025  Screening Not Indicated  History Lipid Disorder      Other Preventive Screenings Covered by Medicare:  Abdominal Aortic Aneurysm (AAA) Screening: covered once if your at risk. You're considered to be at risk if you have a family history of AAA or a male between the age of 65-75 who smoking at least 100 cigarettes in your lifetime.  Lung Cancer Screening: covers low dose CT scan once per year if you meet all of the following conditions: (1) Age 55-77; (2) No signs or symptoms of lung cancer; (3) Current smoker or have quit smoking within the last 15 years; (4) You have a tobacco smoking history of at least 20 pack years (packs per day x number of years you smoked); (5) You get a written order from a healthcare provider.  Glaucoma Screening: covered annually if you're considered high risk: (1) You have diabetes OR (2) Family history of glaucoma OR (3)  aged 50 and older OR (4)  American aged 65 and older  Osteoporosis Screening: covered every 2 years if you meet one of the following conditions: (1) Have a vertebral abnormality; (2) On glucocorticoid therapy for more than 3 months; (3) Have primary hyperparathyroidism; (4) On osteoporosis medications and need to assess response to drug therapy.  HIV Screening: covered annually if you're between the age of 15-65. Also covered annually if you are younger than 15 and older than 65 with risk factors for HIV infection. For pregnant patients, it is covered up to 3 times per pregnancy.    Immunizations:  Immunization Recommendations   Influenza Vaccine Annual influenza vaccination during flu season is recommended for all persons aged >= 6 months who do not have contraindications   Pneumococcal Vaccine   * Pneumococcal conjugate vaccine = PCV13 (Prevnar 13), PCV15  (Vaxneuvance), PCV20 (Prevnar 20)  * Pneumococcal polysaccharide vaccine = PPSV23 (Pneumovax) Adults 19-63 yo with certain risk factors or if 65+ yo  If never received any pneumonia vaccine: recommend Prevnar 20 (PCV20)  Give PCV20 if previously received 1 dose of PCV13 or PPSV23   Hepatitis B Vaccine 3 dose series if at intermediate or high risk (ex: diabetes, end stage renal disease, liver disease)   Respiratory syncytial virus (RSV) Vaccine - COVERED BY MEDICARE PART D  * RSVPreF3 (Arexvy) CDC recommends that adults 60 years of age and older may receive a single dose of RSV vaccine using shared clinical decision-making (SCDM)   Tetanus (Td) Vaccine - COST NOT COVERED BY MEDICARE PART B Following completion of primary series, a booster dose should be given every 10 years to maintain immunity against tetanus. Td may also be given as tetanus wound prophylaxis.   Tdap Vaccine - COST NOT COVERED BY MEDICARE PART B Recommended at least once for all adults. For pregnant patients, recommended with each pregnancy.   Shingles Vaccine (Shingrix) - COST NOT COVERED BY MEDICARE PART B  2 shot series recommended in those 19 years and older who have or will have weakened immune systems or those 50 years and older     Health Maintenance Due:      Topic Date Due   • Hepatitis C Screening  04/15/2052 (Originally 1960)   • Colorectal Cancer Screening  08/25/2026     Immunizations Due:      Topic Date Due   • Pneumococcal Vaccine: 65+ Years (1 of 1 - PCV) Never done   • Influenza Vaccine (1) 09/01/2024   • COVID-19 Vaccine (5 - 2024-25 season) 09/01/2024     Advance Directives   What are advance directives?  Advance directives are legal documents that state your wishes and plans for medical care. These plans are made ahead of time in case you lose your ability to make decisions for yourself. Advance directives can apply to any medical decision, such as the treatments you want, and if you want to donate organs.   What are the  types of advance directives?  There are many types of advance directives, and each state has rules about how to use them. You may choose a combination of any of the following:  Living will:  This is a written record of the treatment you want. You can also choose which treatments you do not want, which to limit, and which to stop at a certain time. This includes surgery, medicine, IV fluid, and tube feedings.   Durable power of  for healthcare (DPAHC):  This is a written record that states who you want to make healthcare choices for you when you are unable to make them for yourself. This person, called a proxy, is usually a family member or a friend. You may choose more than 1 proxy.  Do not resuscitate (DNR) order:  A DNR order is used in case your heart stops beating or you stop breathing. It is a request not to have certain forms of treatment, such as CPR. A DNR order may be included in other types of advance directives.  Medical directive:  This covers the care that you want if you are in a coma, near death, or unable to make decisions for yourself. You can list the treatments you want for each condition. Treatment may include pain medicine, surgery, blood transfusions, dialysis, IV or tube feedings, and a ventilator (breathing machine).  Values history:  This document has questions about your views, beliefs, and how you feel and think about life. This information can help others choose the care that you would choose.  Why are advance directives important?  An advance directive helps you control your care. Although spoken wishes may be used, it is better to have your wishes written down. Spoken wishes can be misunderstood, or not followed. Treatments may be given even if you do not want them. An advance directive may make it easier for your family to make difficult choices about your care.   Weight Management   Why it is important to manage your weight:  Being overweight increases your risk of health  conditions such as heart disease, high blood pressure, type 2 diabetes, and certain types of cancer. It can also increase your risk for osteoarthritis, sleep apnea, and other respiratory problems. Aim for a slow, steady weight loss. Even a small amount of weight loss can lower your risk of health problems.  How to lose weight safely:  A safe and healthy way to lose weight is to eat fewer calories and get regular exercise. You can lose up about 1 pound a week by decreasing the number of calories you eat by 500 calories each day.   Healthy meal plan for weight management:  A healthy meal plan includes a variety of foods, contains fewer calories, and helps you stay healthy. A healthy meal plan includes the following:  Eat whole-grain foods more often.  A healthy meal plan should contain fiber. Fiber is the part of grains, fruits, and vegetables that is not broken down by your body. Whole-grain foods are healthy and provide extra fiber in your diet. Some examples of whole-grain foods are whole-wheat breads and pastas, oatmeal, brown rice, and bulgur.  Eat a variety of vegetables every day.  Include dark, leafy greens such as spinach, kale, aga greens, and mustard greens. Eat yellow and orange vegetables such as carrots, sweet potatoes, and winter squash.   Eat a variety of fruits every day.  Choose fresh or canned fruit (canned in its own juice or light syrup) instead of juice. Fruit juice has very little or no fiber.  Eat low-fat dairy foods.  Drink fat-free (skim) milk or 1% milk. Eat fat-free yogurt and low-fat cottage cheese. Try low-fat cheeses such as mozzarella and other reduced-fat cheeses.  Choose meat and other protein foods that are low in fat.  Choose beans or other legumes such as split peas or lentils. Choose fish, skinless poultry (chicken or turkey), or lean cuts of red meat (beef or pork). Before you cook meat or poultry, cut off any visible fat.   Use less fat and oil.  Try baking foods instead of  frying them. Add less fat, such as margarine, sour cream, regular salad dressing and mayonnaise to foods. Eat fewer high-fat foods. Some examples of high-fat foods include french fries, doughnuts, ice cream, and cakes.  Eat fewer sweets.  Limit foods and drinks that are high in sugar. This includes candy, cookies, regular soda, and sweetened drinks.  Exercise:  Exercise at least 30 minutes per day on most days of the week. Some examples of exercise include walking, biking, dancing, and swimming. You can also fit in more physical activity by taking the stairs instead of the elevator or parking farther away from stores. Ask your healthcare provider about the best exercise plan for you.      © Copyright [employer] 2018 Information is for End User's use only and may not be sold, redistributed or otherwise used for commercial purposes. All illustrations and images included in CareNotes® are the copyrighted property of [employer] or [employer]

## 2025-04-07 NOTE — ASSESSMENT & PLAN NOTE
Referral to psychology for talk therapy    - Patient will be placed on Remeron 15 mg at bedtime to help with some insomnia that he is experiencing as well as his depressive symptoms  Orders:  •  Ambulatory referral to Psych Services; Future  •  mirtazapine (REMERON) 15 mg tablet; Take 1 tablet (15 mg total) by mouth daily at bedtime

## 2025-04-07 NOTE — ASSESSMENT & PLAN NOTE
Cholesterol is very well-controlled on rosuvastatin 5 mg once daily.  Continue same.  Reevaluate cholesterol in September  Orders:  •  Comprehensive metabolic panel; Future  •  Lipid panel; Future

## 2025-04-23 ENCOUNTER — TELEPHONE (OUTPATIENT)
Age: 65
End: 2025-04-23

## 2025-04-23 NOTE — TELEPHONE ENCOUNTER
Contacted patient in regards to Routine Referral in attempts to verify patient's needs of services. Writer verified Full Name and . Writer spoke with patient who stated that he is not interested in services.    Final Call, closed, Pt refused services

## 2025-05-07 PROBLEM — Z00.00 WELCOME TO MEDICARE PREVENTIVE VISIT: Status: RESOLVED | Noted: 2025-04-07 | Resolved: 2025-05-07

## 2025-06-25 DIAGNOSIS — E03.9 HYPOTHYROIDISM, UNSPECIFIED TYPE: ICD-10-CM

## 2025-06-26 RX ORDER — LEVOTHYROXINE SODIUM 137 UG/1
137 TABLET ORAL DAILY
Qty: 90 TABLET | Refills: 1 | Status: SHIPPED | OUTPATIENT
Start: 2025-06-26

## 2025-07-01 ENCOUNTER — NURSE TRIAGE (OUTPATIENT)
Age: 65
End: 2025-07-01

## 2025-07-01 DIAGNOSIS — E03.9 HYPOTHYROIDISM, UNSPECIFIED TYPE: ICD-10-CM

## 2025-07-01 RX ORDER — LEVOTHYROXINE SODIUM 137 UG/1
137 TABLET ORAL DAILY
Qty: 90 TABLET | Refills: 1 | Status: SHIPPED | OUTPATIENT
Start: 2025-07-01

## 2025-07-01 NOTE — TELEPHONE ENCOUNTER
Regarding: Medication Problem  ----- Message from Anna MON[place] sent at 7/1/2025  9:26 AM EDT -----  Can you please have Dr. Palafox send a prescription of Levothyroxin to [facility] on [address]. I no longer get my meds from [facility] pharmacy.  Thank you

## 2025-07-01 NOTE — TELEPHONE ENCOUNTER
Levothyroxine sent to [facility] pharmacy.  Sent to [facility] on [address] now.       Reason for Disposition   Prescription prescribed recently is not at pharmacy and triager has access to patient's EMR and prescription is recorded in the EMR    Answer Assessment - Initial Assessment Questions  levothyroxine 137 mcg tablet Take 1 tablet by mouth once daily          Summary: Take 1 tablet by mouth once daily, Starting Thu 6/26/2025, Normal  Guidelines: Dose, Route, Frequency: 137 mcg, Oral, DailyStart: 06/26/2025  Ordering Department: [facility]  Authorized By: Arsh Palafox DO  Dispense: 90 tablet  Refills: 1 ordered    Protocols used: Medication Question Call-Adult-[place]